# Patient Record
Sex: MALE | NOT HISPANIC OR LATINO | URBAN - METROPOLITAN AREA
[De-identification: names, ages, dates, MRNs, and addresses within clinical notes are randomized per-mention and may not be internally consistent; named-entity substitution may affect disease eponyms.]

---

## 2017-06-15 PROBLEM — Z00.00 ENCOUNTER FOR PREVENTIVE HEALTH EXAMINATION: Status: ACTIVE | Noted: 2017-06-15

## 2021-04-16 ENCOUNTER — EMERGENCY (EMERGENCY)
Facility: HOSPITAL | Age: 46
LOS: 1 days | Discharge: HOME | End: 2021-04-16
Attending: STUDENT IN AN ORGANIZED HEALTH CARE EDUCATION/TRAINING PROGRAM
Payer: SELF-PAY

## 2021-04-16 VITALS
TEMPERATURE: 98 F | DIASTOLIC BLOOD PRESSURE: 90 MMHG | WEIGHT: 177.91 LBS | HEART RATE: 87 BPM | SYSTOLIC BLOOD PRESSURE: 141 MMHG | OXYGEN SATURATION: 99 % | RESPIRATION RATE: 16 BRPM

## 2021-04-16 LAB
APPEARANCE UR: CLEAR — SIGNIFICANT CHANGE UP
BILIRUB UR-MCNC: NEGATIVE — SIGNIFICANT CHANGE UP
COLOR SPEC: YELLOW — SIGNIFICANT CHANGE UP
DIFF PNL FLD: NEGATIVE — SIGNIFICANT CHANGE UP
GLUCOSE UR QL: NEGATIVE — SIGNIFICANT CHANGE UP
KETONES UR-MCNC: NEGATIVE — SIGNIFICANT CHANGE UP
LEUKOCYTE ESTERASE UR-ACNC: NEGATIVE — SIGNIFICANT CHANGE UP
NITRITE UR-MCNC: NEGATIVE — SIGNIFICANT CHANGE UP
PH UR: 5 — SIGNIFICANT CHANGE UP (ref 5–8)
PROT UR-MCNC: 15
RBC CASTS # UR COMP ASSIST: SIGNIFICANT CHANGE UP /HPF (ref 0–2)
SP GR SPEC: 1.02 — SIGNIFICANT CHANGE UP (ref 1.01–1.02)
UROBILINOGEN FLD QL: NEGATIVE — SIGNIFICANT CHANGE UP
WBC UR QL: SIGNIFICANT CHANGE UP /HPF (ref 0–5)

## 2021-04-16 PROCEDURE — 74176 CT ABD & PELVIS W/O CONTRAST: CPT | Mod: 26,MA

## 2021-04-16 PROCEDURE — 99284 EMERGENCY DEPT VISIT MOD MDM: CPT

## 2021-04-16 PROCEDURE — 81001 URINALYSIS AUTO W/SCOPE: CPT

## 2021-04-16 PROCEDURE — 74176 CT ABD & PELVIS W/O CONTRAST: CPT

## 2021-04-16 PROCEDURE — 96374 THER/PROPH/DIAG INJ IV PUSH: CPT

## 2021-04-16 PROCEDURE — 99284 EMERGENCY DEPT VISIT MOD MDM: CPT | Mod: 25

## 2021-04-16 PROCEDURE — 96361 HYDRATE IV INFUSION ADD-ON: CPT

## 2021-04-16 PROCEDURE — 87086 URINE CULTURE/COLONY COUNT: CPT

## 2021-04-16 RX ORDER — KETOROLAC TROMETHAMINE 30 MG/ML
30 SYRINGE (ML) INJECTION ONCE
Refills: 0 | Status: DISCONTINUED | OUTPATIENT
Start: 2021-04-16 | End: 2021-04-16

## 2021-04-16 RX ORDER — SODIUM CHLORIDE 9 MG/ML
1000 INJECTION INTRAMUSCULAR; INTRAVENOUS; SUBCUTANEOUS ONCE
Refills: 0 | Status: COMPLETED | OUTPATIENT
Start: 2021-04-16 | End: 2021-04-16

## 2021-04-16 RX ADMIN — SODIUM CHLORIDE 1000 MILLILITER(S): 9 INJECTION INTRAMUSCULAR; INTRAVENOUS; SUBCUTANEOUS at 18:04

## 2021-04-16 RX ADMIN — Medication 30 MILLIGRAM(S): at 17:04

## 2021-04-16 RX ADMIN — Medication 30 MILLIGRAM(S): at 18:04

## 2021-04-16 RX ADMIN — SODIUM CHLORIDE 1000 MILLILITER(S): 9 INJECTION INTRAMUSCULAR; INTRAVENOUS; SUBCUTANEOUS at 17:04

## 2021-04-16 NOTE — ED PROVIDER NOTE - OBJECTIVE STATEMENT
45M, no significant pmh, presenting with right flank pain. patient reports right sided flank pain that radiates to his stomach for several days. pain worse with movement. no fever, nausea, vomiting, pain or burning with urination or blood in urine.

## 2021-04-16 NOTE — ED PROVIDER NOTE - CLINICAL SUMMARY MEDICAL DECISION MAKING FREE TEXT BOX
45M presenting with flank pain. non-tender on exam. no nausea or vomiting. concern for kidney stone. will get labs, US, CT scan.

## 2021-04-16 NOTE — ED PROVIDER NOTE - PATIENT PORTAL LINK FT
You can access the FollowMyHealth Patient Portal offered by [facility] by registering at the following website: [email or web address]. By joining [facility]’s FollowMyHealth portal, you will also be able to view your health information using other applications (apps) compatible with our system.

## 2021-04-16 NOTE — ED ADULT NURSE NOTE - OBJECTIVE STATEMENT
States he has right lower back pain ,right flank pain radiating to right lower abdomen since yesterday had same symptoms a month ago .

## 2021-04-16 NOTE — ED ADULT NURSE NOTE - NSIMPLEMENTINTERV_GEN_ALL_ED
Implemented All Fall Risk Interventions:  [place] to call system. Call bell, personal items and telephone within reach. Instruct patient to call for assistance. Room bathroom lighting operational. Non-slip footwear when patient is off stretcher. Physically safe environment: no spills, clutter or unnecessary equipment. Stretcher in lowest position, wheels locked, appropriate side rails in place. Provide visual cue, wrist band, yellow gown, etc. Monitor gait and stability. Monitor for mental status changes and reorient to person, place, and time. Review medications for side effects contributing to fall risk. Reinforce activity limits and safety measures with patient and family.

## 2021-04-16 NOTE — ED PROVIDER NOTE - NSFOLLOWUPINSTRUCTIONS_ED_ALL_ED_FT
You were seen in the emergency department for abdominal pain.     Please follow-up with your primary care doctor in the next 24-48 hours.     Please take 600mg Ibuprofen every 6 hours or 975mg Tylenol every 6 hours for pain control.     If you have any worsening symptoms, severe abdominal pain, nausea or vomiting, please return to the emergency department.

## 2021-04-17 LAB
CULTURE RESULTS: SIGNIFICANT CHANGE UP
SPECIMEN SOURCE: SIGNIFICANT CHANGE UP

## 2023-07-26 NOTE — ED ADULT TRIAGE NOTE - CCCP TRG CHIEF CMPLNT
pain, flank Double Island Pedicle Flap Text: The defect edges were debeveled with a #15 scalpel blade.  Given the location of the defect, shape of the defect and the proximity to free margins a double island pedicle advancement flap was deemed most appropriate.  Using a sterile surgical marker, an appropriate advancement flap was drawn incorporating the defect, outlining the appropriate donor tissue and placing the expected incisions within the relaxed skin tension lines where possible.    The area thus outlined was incised deep to adipose tissue with a #15 scalpel blade.  The skin margins were undermined to an appropriate distance in all directions around the primary defect and laterally outward around the island pedicle utilizing iris scissors.  There was minimal undermining beneath the pedicle flap.

## 2024-04-04 ENCOUNTER — EMERGENCY (EMERGENCY)
Facility: HOSPITAL | Age: 49
LOS: 1 days | Discharge: HOME | End: 2024-04-04
Attending: EMERGENCY MEDICINE
Payer: MEDICAID

## 2024-04-04 VITALS
TEMPERATURE: 98 F | DIASTOLIC BLOOD PRESSURE: 67 MMHG | HEART RATE: 86 BPM | RESPIRATION RATE: 18 BRPM | SYSTOLIC BLOOD PRESSURE: 102 MMHG | OXYGEN SATURATION: 99 %

## 2024-04-04 VITALS
DIASTOLIC BLOOD PRESSURE: 87 MMHG | HEIGHT: 68 IN | TEMPERATURE: 100 F | SYSTOLIC BLOOD PRESSURE: 131 MMHG | WEIGHT: 193.12 LBS | HEART RATE: 113 BPM | OXYGEN SATURATION: 96 % | RESPIRATION RATE: 17 BRPM

## 2024-04-04 LAB
ALBUMIN SERPL ELPH-MCNC: 2.7 G/DL — LOW (ref 3.5–5)
ALP SERPL-CCNC: 100 U/L — SIGNIFICANT CHANGE UP (ref 40–120)
ALT FLD-CCNC: 37 U/L DA — SIGNIFICANT CHANGE UP (ref 10–60)
ANION GAP SERPL CALC-SCNC: 6 MMOL/L — SIGNIFICANT CHANGE UP (ref 5–17)
AST SERPL-CCNC: 27 U/L — SIGNIFICANT CHANGE UP (ref 10–40)
BASOPHILS # BLD AUTO: 0.02 K/UL — SIGNIFICANT CHANGE UP (ref 0–0.2)
BASOPHILS NFR BLD AUTO: 0.2 % — SIGNIFICANT CHANGE UP (ref 0–2)
BILIRUB SERPL-MCNC: 1.2 MG/DL — SIGNIFICANT CHANGE UP (ref 0.2–1.2)
BUN SERPL-MCNC: 15 MG/DL — SIGNIFICANT CHANGE UP (ref 7–18)
CALCIUM SERPL-MCNC: 9.1 MG/DL — SIGNIFICANT CHANGE UP (ref 8.4–10.5)
CHLORIDE SERPL-SCNC: 100 MMOL/L — SIGNIFICANT CHANGE UP (ref 96–108)
CO2 SERPL-SCNC: 28 MMOL/L — SIGNIFICANT CHANGE UP (ref 22–31)
CREAT SERPL-MCNC: 1.26 MG/DL — SIGNIFICANT CHANGE UP (ref 0.5–1.3)
EGFR: 71 ML/MIN/1.73M2 — SIGNIFICANT CHANGE UP
EOSINOPHIL # BLD AUTO: 0 K/UL — SIGNIFICANT CHANGE UP (ref 0–0.5)
EOSINOPHIL NFR BLD AUTO: 0 % — SIGNIFICANT CHANGE UP (ref 0–6)
FLUAV AG NPH QL: SIGNIFICANT CHANGE UP
FLUBV AG NPH QL: SIGNIFICANT CHANGE UP
GLUCOSE SERPL-MCNC: 104 MG/DL — HIGH (ref 70–99)
HCT VFR BLD CALC: 45.1 % — SIGNIFICANT CHANGE UP (ref 39–50)
HGB BLD-MCNC: 15 G/DL — SIGNIFICANT CHANGE UP (ref 13–17)
IMM GRANULOCYTES NFR BLD AUTO: 0.3 % — SIGNIFICANT CHANGE UP (ref 0–0.9)
LYMPHOCYTES # BLD AUTO: 0.66 K/UL — LOW (ref 1–3.3)
LYMPHOCYTES # BLD AUTO: 6.8 % — LOW (ref 13–44)
MCHC RBC-ENTMCNC: 27.9 PG — SIGNIFICANT CHANGE UP (ref 27–34)
MCHC RBC-ENTMCNC: 33.3 GM/DL — SIGNIFICANT CHANGE UP (ref 32–36)
MCV RBC AUTO: 83.8 FL — SIGNIFICANT CHANGE UP (ref 80–100)
MONOCYTES # BLD AUTO: 1.46 K/UL — HIGH (ref 0–0.9)
MONOCYTES NFR BLD AUTO: 15 % — HIGH (ref 2–14)
NEUTROPHILS # BLD AUTO: 7.58 K/UL — HIGH (ref 1.8–7.4)
NEUTROPHILS NFR BLD AUTO: 77.7 % — HIGH (ref 43–77)
NRBC # BLD: 0 /100 WBCS — SIGNIFICANT CHANGE UP (ref 0–0)
NT-PROBNP SERPL-SCNC: 124 PG/ML — SIGNIFICANT CHANGE UP (ref 0–125)
PLATELET # BLD AUTO: 69 K/UL — LOW (ref 150–400)
POTASSIUM SERPL-MCNC: 3.1 MMOL/L — LOW (ref 3.5–5.3)
POTASSIUM SERPL-SCNC: 3.1 MMOL/L — LOW (ref 3.5–5.3)
PROT SERPL-MCNC: 7.1 G/DL — SIGNIFICANT CHANGE UP (ref 6–8.3)
RBC # BLD: 5.38 M/UL — SIGNIFICANT CHANGE UP (ref 4.2–5.8)
RBC # FLD: 14.5 % — SIGNIFICANT CHANGE UP (ref 10.3–14.5)
SARS-COV-2 RNA SPEC QL NAA+PROBE: SIGNIFICANT CHANGE UP
SODIUM SERPL-SCNC: 134 MMOL/L — LOW (ref 135–145)
TROPONIN I, HIGH SENSITIVITY RESULT: 4.9 NG/L — SIGNIFICANT CHANGE UP
WBC # BLD: 9.75 K/UL — SIGNIFICANT CHANGE UP (ref 3.8–10.5)
WBC # FLD AUTO: 9.75 K/UL — SIGNIFICANT CHANGE UP (ref 3.8–10.5)

## 2024-04-04 PROCEDURE — 71046 X-RAY EXAM CHEST 2 VIEWS: CPT

## 2024-04-04 PROCEDURE — 83880 ASSAY OF NATRIURETIC PEPTIDE: CPT

## 2024-04-04 PROCEDURE — 82962 GLUCOSE BLOOD TEST: CPT

## 2024-04-04 PROCEDURE — 96374 THER/PROPH/DIAG INJ IV PUSH: CPT

## 2024-04-04 PROCEDURE — 36415 COLL VENOUS BLD VENIPUNCTURE: CPT

## 2024-04-04 PROCEDURE — 93010 ELECTROCARDIOGRAM REPORT: CPT

## 2024-04-04 PROCEDURE — 80053 COMPREHEN METABOLIC PANEL: CPT

## 2024-04-04 PROCEDURE — 99285 EMERGENCY DEPT VISIT HI MDM: CPT

## 2024-04-04 PROCEDURE — 84484 ASSAY OF TROPONIN QUANT: CPT

## 2024-04-04 PROCEDURE — 96375 TX/PRO/DX INJ NEW DRUG ADDON: CPT

## 2024-04-04 PROCEDURE — 99285 EMERGENCY DEPT VISIT HI MDM: CPT | Mod: 25

## 2024-04-04 PROCEDURE — 87637 SARSCOV2&INF A&B&RSV AMP PRB: CPT

## 2024-04-04 PROCEDURE — 93005 ELECTROCARDIOGRAM TRACING: CPT

## 2024-04-04 PROCEDURE — 70450 CT HEAD/BRAIN W/O DYE: CPT | Mod: MC

## 2024-04-04 PROCEDURE — 70450 CT HEAD/BRAIN W/O DYE: CPT | Mod: 26,MC

## 2024-04-04 PROCEDURE — 85025 COMPLETE CBC W/AUTO DIFF WBC: CPT

## 2024-04-04 PROCEDURE — 71046 X-RAY EXAM CHEST 2 VIEWS: CPT | Mod: 26

## 2024-04-04 RX ORDER — ACETAMINOPHEN 500 MG
1000 TABLET ORAL ONCE
Refills: 0 | Status: COMPLETED | OUTPATIENT
Start: 2024-04-04 | End: 2024-04-04

## 2024-04-04 RX ORDER — ONDANSETRON 8 MG/1
4 TABLET, FILM COATED ORAL ONCE
Refills: 0 | Status: COMPLETED | OUTPATIENT
Start: 2024-04-04 | End: 2024-04-04

## 2024-04-04 RX ORDER — POTASSIUM CHLORIDE 20 MEQ
40 PACKET (EA) ORAL ONCE
Refills: 0 | Status: COMPLETED | OUTPATIENT
Start: 2024-04-04 | End: 2024-04-04

## 2024-04-04 RX ORDER — KETOROLAC TROMETHAMINE 30 MG/ML
15 SYRINGE (ML) INJECTION ONCE
Refills: 0 | Status: DISCONTINUED | OUTPATIENT
Start: 2024-04-04 | End: 2024-04-04

## 2024-04-04 RX ADMIN — Medication 15 MILLIGRAM(S): at 17:09

## 2024-04-04 RX ADMIN — Medication 400 MILLIGRAM(S): at 17:09

## 2024-04-04 RX ADMIN — Medication 40 MILLIEQUIVALENT(S): at 18:09

## 2024-04-04 RX ADMIN — ONDANSETRON 4 MILLIGRAM(S): 8 TABLET, FILM COATED ORAL at 17:09

## 2024-04-04 NOTE — ED ADULT NURSE NOTE - NSFALLUNIVINTERV_ED_ALL_ED
Bed/Stretcher in lowest position, wheels locked, appropriate side rails in place/Call bell, personal items and telephone in reach/Instruct patient to call for assistance before getting out of bed/chair/stretcher/Non-slip footwear applied when patient is off stretcher/[place] to call system/Physically safe environment - no spills, clutter or unnecessary equipment/Purposeful proactive rounding/Room/bathroom lighting operational, light cord in reach

## 2024-04-04 NOTE — ED PROVIDER NOTE - NSFOLLOWUPINSTRUCTIONS_ED_ALL_ED_FT
Sinus Infection, Adult  A person's face, and a person's face showing an internal view of the sinuses. Here the sinuses contain mucus.  A sinus infection, also called sinusitis, is inflammation of your sinuses. Sinuses are hollow spaces in the bones around your face. Your sinuses are located:  Around your eyes.  In the middle of your forehead.  Behind your nose.  In your cheekbones.  Mucus normally drains out of your sinuses. When your nasal tissues become inflamed or swollen, mucus can become trapped or blocked. This allows bacteria, viruses, and fungi to grow, which leads to infection. Most infections of the sinuses are caused by a virus.    A sinus infection can develop quickly. It can last for up to 4 weeks (acute) or for more than 12 weeks (chronic). A sinus infection often develops after a cold.    What are the causes?  This condition is caused by anything that creates swelling in the sinuses or stops mucus from draining. This includes:  Allergies.  Asthma.  Infection from bacteria or viruses.  Deformities or blockages in your nose or sinuses.  Abnormal growths in the nose (nasal polyps).  Pollutants, such as chemicals or irritants in the air.  Infection from fungi. This is rare.  What increases the risk?  You are more likely to develop this condition if you:  Have a weak body defense system (immune system).  Do a lot of swimming or diving.  Overuse nasal sprays.  Smoke.  What are the signs or symptoms?  The main symptoms of this condition are pain and a feeling of pressure around the affected sinuses. Other symptoms include:  Stuffy nose or congestion that makes it difficult to breathe through your nose.  Thick yellow or greenish drainage from your nose.  Tenderness, swelling, and warmth over the affected sinuses.  A cough that may get worse at night.  Decreased sense of smell and taste.  Extra mucus that collects in the throat or the back of the nose (postnasal drip) causing a sore throat or bad breath.  Tiredness (fatigue).  Fever.  How is this diagnosed?  This condition is diagnosed based on:  Your symptoms.  Your medical history.  A physical exam.  Tests to find out if your condition is acute or chronic. This may include:  Checking your nose for nasal polyps.  Viewing your sinuses using a device that has a light (endoscope).  Testing for allergies or bacteria.  Imaging tests, such as an MRI or CT scan.  In rare cases, a bone biopsy may be done to rule out more serious types of fungal sinus disease.    How is this treated?  Treatment for a sinus infection depends on the cause and whether your condition is chronic or acute.  If caused by a virus, your symptoms should go away on their own within 10 days. You may be given medicines to relieve symptoms. They include:  Medicines that shrink swollen nasal passages (decongestants).  A spray that eases inflammation of the nostrils (topical intranasal corticosteroids).  Rinses that help get rid of thick mucus in your nose (nasal saline washes).  Medicines that treat allergies (antihistamines).  Over-the-counter pain relievers.  If caused by bacteria, your health care provider may recommend waiting to see if your symptoms improve. Most bacterial infections will get better without antibiotic medicine. You may be given antibiotics if you have:  A severe infection.  A weak immune system.  If caused by narrow nasal passages or nasal polyps, surgery may be needed.  Follow these instructions at home:  Medicines    Take, use, or apply over-the-counter and prescription medicines only as told by your health care provider. These may include nasal sprays.  If you were prescribed an antibiotic medicine, take it as told by your health care provider. Do not stop taking the antibiotic even if you start to feel better.  Hydrate and humidify    A comparison of three sample cups showing dark yellow, yellow, and pale yellow urine.  Drink enough fluid to keep your urine pale yellow. Staying hydrated will help to thin your mucus.  Use a cool mist humidifier to keep the humidity level in your home above 50%.  Inhale steam for 10–15 minutes, 3–4 times a day, or as told by your health care provider. You can do this in the bathroom while a hot shower is running.  Limit your exposure to cool or dry air.  Rest    Rest as much as possible.  Sleep with your head raised (elevated).  Make sure you get enough sleep each night.  General instructions    A person washing hands with soap and water.  Apply a warm, moist washcloth to your face 3–4 times a day or as told by your health care provider. This will help with discomfort.  Use nasal saline washes as often as told by your health care provider.  Wash your hands often with soap and water to reduce your exposure to germs. If soap and water are not available, use hand .  Do not smoke. Avoid being around people who are smoking (secondhand smoke).  Keep all follow-up visits. This is important.  Contact a health care provider if:  You have a fever.  Your symptoms get worse.  Your symptoms do not improve within 10 days.  Get help right away if:  You have a severe headache.  You have persistent vomiting.  You have severe pain or swelling around your face or eyes.  You have vision problems.  You develop confusion.  Your neck is stiff.  You have trouble breathing.  These symptoms may be an emergency. Get help right away. Call 911.  Do not wait to see if the symptoms will go away.  Do not drive yourself to the hospital.  Summary  A sinus infection is soreness and inflammation of your sinuses. Sinuses are hollow spaces in the bones around your face.  This condition is caused by nasal tissues that become inflamed or swollen. The swelling traps or blocks the flow of mucus. This allows bacteria, viruses, and fungi to grow, which leads to infection.  If you were prescribed an antibiotic medicine, take it as told by your health care provider. Do not stop taking the antibiotic even if you start to feel better.  Keep all follow-up visits. This is important.  This information is not intended to replace advice given to you by your health care provider. Make sure you discuss any questions you have with your health care provider.    Document Revised: 11/22/2022 Document Reviewed: 11/22/2022  [employer] Patient Education © 2024 [employer]

## 2024-04-04 NOTE — ED ADULT TRIAGE NOTE - CHIEF COMPLAINT QUOTE
Patient c/o syncopal episode yesterday, denies hitting head. Reports headache, nausea, and shortness of breath x 4 days. Denies chest pain. FS in triage 109

## 2024-04-04 NOTE — ED PROVIDER NOTE - CLINICAL SUMMARY MEDICAL DECISION MAKING FREE TEXT BOX
48-year-old male presents to ED with headache, sinus pressure, flulike symptoms.  Concern for sinusitis versus COVID versus flu versus less likely pneumonia.  Will check labs, supportive care, viral swab, reassess

## 2024-04-04 NOTE — ED PROVIDER NOTE - PROGRESS NOTE DETAILS
Labs and imaging negative.  Patient reassessed and reports feeling better.  Will treat with antibiotics for clinical sinusitis

## 2024-04-04 NOTE — ED PROVIDER NOTE - OBJECTIVE STATEMENT
48-year-old male history of hyperlipidemia, not on any medications, presents with a complaint of headache, shortness of breath, and a syncopal episode.  As per patient symptoms began 4 days ago with a headache.  Patient thought it could have been a possible sinus infection.  The following day patient developed subjective fever and chills.  Patient then developed nausea.  Headache continued.  Yesterday patient said he had a syncopal episode.  Headache and shortness of breath continued today so the patient came in for evaluation.  No chest pain, no cough, no diarrhea, no urinary complaints, no sore throat, no rash

## 2024-04-04 NOTE — ED PROVIDER NOTE - PATIENT PORTAL LINK FT
You can access the FollowMyHealth Patient Portal offered by [facility] by registering at the following website: [email or web address]. By joining [facility]’s FollowMyHealth portal, you will also be able to view your health information using other applications (apps) compatible with our system.

## 2024-04-06 ENCOUNTER — INPATIENT (INPATIENT)
Facility: HOSPITAL | Age: 49
LOS: 4 days | Discharge: HOME | End: 2024-04-11
Attending: INTERNAL MEDICINE | Admitting: INTERNAL MEDICINE
Payer: MEDICAID

## 2024-04-06 VITALS
DIASTOLIC BLOOD PRESSURE: 82 MMHG | HEIGHT: 68 IN | RESPIRATION RATE: 18 BRPM | HEART RATE: 112 BPM | TEMPERATURE: 100 F | SYSTOLIC BLOOD PRESSURE: 113 MMHG | OXYGEN SATURATION: 106 %

## 2024-04-06 DIAGNOSIS — R42 DIZZINESS AND GIDDINESS: ICD-10-CM

## 2024-04-06 LAB
ALBUMIN SERPL ELPH-MCNC: 3.1 G/DL — LOW (ref 3.3–5)
ALP SERPL-CCNC: 187 U/L — HIGH (ref 40–120)
ALT FLD-CCNC: 75 U/L — HIGH (ref 4–41)
ANION GAP SERPL CALC-SCNC: 14 MMOL/L — SIGNIFICANT CHANGE UP (ref 7–14)
APPEARANCE CSF: CLEAR — SIGNIFICANT CHANGE UP
APPEARANCE SPUN FLD: COLORLESS — SIGNIFICANT CHANGE UP
APPEARANCE UR: CLEAR — SIGNIFICANT CHANGE UP
APTT BLD: 34.4 SEC — SIGNIFICANT CHANGE UP (ref 24.5–35.6)
AST SERPL-CCNC: 105 U/L — HIGH (ref 4–40)
BACTERIAL AG PNL SER: 6 % — SIGNIFICANT CHANGE UP
BASE EXCESS BLDV CALC-SCNC: 0.2 MMOL/L — SIGNIFICANT CHANGE UP (ref -2–3)
BASOPHILS # BLD AUTO: 0 K/UL — SIGNIFICANT CHANGE UP (ref 0–0.2)
BASOPHILS NFR BLD AUTO: 0 % — SIGNIFICANT CHANGE UP (ref 0–2)
BILIRUB SERPL-MCNC: 0.7 MG/DL — SIGNIFICANT CHANGE UP (ref 0.2–1.2)
BILIRUB UR-MCNC: NEGATIVE — SIGNIFICANT CHANGE UP
BLOOD GAS VENOUS COMPREHENSIVE RESULT: SIGNIFICANT CHANGE UP
BUN SERPL-MCNC: 17 MG/DL — SIGNIFICANT CHANGE UP (ref 7–23)
CALCIUM SERPL-MCNC: 8.8 MG/DL — SIGNIFICANT CHANGE UP (ref 8.4–10.5)
CHLORIDE BLDV-SCNC: 99 MMOL/L — SIGNIFICANT CHANGE UP (ref 96–108)
CHLORIDE SERPL-SCNC: 97 MMOL/L — LOW (ref 98–107)
CO2 BLDV-SCNC: 27.4 MMOL/L — HIGH (ref 22–26)
CO2 SERPL-SCNC: 23 MMOL/L — SIGNIFICANT CHANGE UP (ref 22–31)
COLOR CSF: COLORLESS — SIGNIFICANT CHANGE UP
COLOR SPEC: YELLOW — SIGNIFICANT CHANGE UP
CREAT SERPL-MCNC: 0.99 MG/DL — SIGNIFICANT CHANGE UP (ref 0.5–1.3)
DIFF PNL FLD: NEGATIVE — SIGNIFICANT CHANGE UP
EGFR: 95 ML/MIN/1.73M2 — SIGNIFICANT CHANGE UP
EOSINOPHIL # BLD AUTO: 0 K/UL — SIGNIFICANT CHANGE UP (ref 0–0.5)
EOSINOPHIL NFR BLD AUTO: 0 % — SIGNIFICANT CHANGE UP (ref 0–6)
FLUAV AG NPH QL: SIGNIFICANT CHANGE UP
FLUBV AG NPH QL: SIGNIFICANT CHANGE UP
GAS PNL BLDV: 131 MMOL/L — LOW (ref 136–145)
GIANT PLATELETS BLD QL SMEAR: PRESENT — SIGNIFICANT CHANGE UP
GLUCOSE BLDV-MCNC: 109 MG/DL — HIGH (ref 70–99)
GLUCOSE CSF-MCNC: 67 MG/DL — SIGNIFICANT CHANGE UP (ref 40–70)
GLUCOSE SERPL-MCNC: 132 MG/DL — HIGH (ref 70–99)
GLUCOSE UR QL: NEGATIVE MG/DL — SIGNIFICANT CHANGE UP
HCO3 BLDV-SCNC: 26 MMOL/L — SIGNIFICANT CHANGE UP (ref 22–29)
HCT VFR BLD CALC: 44.4 % — SIGNIFICANT CHANGE UP (ref 39–50)
HCT VFR BLDA CALC: 47 % — SIGNIFICANT CHANGE UP (ref 39–51)
HGB BLD CALC-MCNC: 15.7 G/DL — SIGNIFICANT CHANGE UP (ref 12.6–17.4)
HGB BLD-MCNC: 15.2 G/DL — SIGNIFICANT CHANGE UP (ref 13–17)
IANC: 5.74 K/UL — SIGNIFICANT CHANGE UP (ref 1.8–7.4)
INR BLD: 1.14 RATIO — SIGNIFICANT CHANGE UP (ref 0.85–1.18)
KETONES UR-MCNC: 15 MG/DL
LACTATE BLDV-MCNC: 1.6 MMOL/L — SIGNIFICANT CHANGE UP (ref 0.5–2)
LEUKOCYTE ESTERASE UR-ACNC: NEGATIVE — SIGNIFICANT CHANGE UP
LYMPHOCYTES # BLD AUTO: 0.69 K/UL — LOW (ref 1–3.3)
LYMPHOCYTES # BLD AUTO: 8.7 % — LOW (ref 13–44)
LYMPHOCYTES # CSF: 49 % — SIGNIFICANT CHANGE UP
MCHC RBC-ENTMCNC: 28 PG — SIGNIFICANT CHANGE UP (ref 27–34)
MCHC RBC-ENTMCNC: 34.2 GM/DL — SIGNIFICANT CHANGE UP (ref 32–36)
MCV RBC AUTO: 81.9 FL — SIGNIFICANT CHANGE UP (ref 80–100)
MONOCYTES # BLD AUTO: 1.17 K/UL — HIGH (ref 0–0.9)
MONOCYTES NFR BLD AUTO: 14.8 % — HIGH (ref 2–14)
MONOS+MACROS NFR CSF: 43 % — SIGNIFICANT CHANGE UP
NEUTROPHILS # BLD AUTO: 6.03 K/UL — SIGNIFICANT CHANGE UP (ref 1.8–7.4)
NEUTROPHILS # CSF: 2 % — SIGNIFICANT CHANGE UP
NEUTROPHILS NFR BLD AUTO: 72.2 % — SIGNIFICANT CHANGE UP (ref 43–77)
NEUTS BAND # BLD: 4.3 % — SIGNIFICANT CHANGE UP (ref 0–6)
NITRITE UR-MCNC: NEGATIVE — SIGNIFICANT CHANGE UP
NRBC NFR CSF: 58 CELLS/UL — HIGH (ref 0–5)
PCO2 BLDV: 45 MMHG — SIGNIFICANT CHANGE UP (ref 42–55)
PH BLDV: 7.37 — SIGNIFICANT CHANGE UP (ref 7.32–7.43)
PH UR: 6 — SIGNIFICANT CHANGE UP (ref 5–8)
PLAT MORPH BLD: NORMAL — SIGNIFICANT CHANGE UP
PLATELET # BLD AUTO: 87 K/UL — LOW (ref 150–400)
PLATELET COUNT - ESTIMATE: ABNORMAL
PO2 BLDV: 29 MMHG — SIGNIFICANT CHANGE UP (ref 25–45)
POTASSIUM BLDV-SCNC: 3.3 MMOL/L — LOW (ref 3.5–5.1)
POTASSIUM SERPL-MCNC: 3.7 MMOL/L — SIGNIFICANT CHANGE UP (ref 3.5–5.3)
POTASSIUM SERPL-SCNC: 3.7 MMOL/L — SIGNIFICANT CHANGE UP (ref 3.5–5.3)
PROT CSF-MCNC: 64 MG/DL — HIGH (ref 15–45)
PROT SERPL-MCNC: 7.4 G/DL — SIGNIFICANT CHANGE UP (ref 6–8.3)
PROT UR-MCNC: SIGNIFICANT CHANGE UP MG/DL
PROTHROM AB SERPL-ACNC: 12.8 SEC — SIGNIFICANT CHANGE UP (ref 9.5–13)
RBC # BLD: 5.42 M/UL — SIGNIFICANT CHANGE UP (ref 4.2–5.8)
RBC # CSF: 11 CELLS/UL — HIGH (ref 0–0)
RBC # FLD: 14.8 % — HIGH (ref 10.3–14.5)
RBC BLD AUTO: NORMAL — SIGNIFICANT CHANGE UP
RSV RNA NPH QL NAA+NON-PROBE: SIGNIFICANT CHANGE UP
SAO2 % BLDV: 42.7 % — LOW (ref 67–88)
SARS-COV-2 RNA SPEC QL NAA+PROBE: SIGNIFICANT CHANGE UP
SODIUM SERPL-SCNC: 134 MMOL/L — LOW (ref 135–145)
SP GR SPEC: 1.05 — HIGH (ref 1–1.03)
TOTAL CELLS COUNTED, SPINAL FLUID: 100 CELLS — SIGNIFICANT CHANGE UP
TUBE TYPE: SIGNIFICANT CHANGE UP
UROBILINOGEN FLD QL: 1 MG/DL — SIGNIFICANT CHANGE UP (ref 0.2–1)
WBC # BLD: 7.88 K/UL — SIGNIFICANT CHANGE UP (ref 3.8–10.5)
WBC # FLD AUTO: 7.88 K/UL — SIGNIFICANT CHANGE UP (ref 3.8–10.5)

## 2024-04-06 PROCEDURE — 62270 DX LMBR SPI PNXR: CPT

## 2024-04-06 PROCEDURE — 70496 CT ANGIOGRAPHY HEAD: CPT | Mod: 26,MC

## 2024-04-06 PROCEDURE — 70498 CT ANGIOGRAPHY NECK: CPT | Mod: 26,MC

## 2024-04-06 PROCEDURE — 71045 X-RAY EXAM CHEST 1 VIEW: CPT | Mod: 26

## 2024-04-06 PROCEDURE — 99285 EMERGENCY DEPT VISIT HI MDM: CPT | Mod: 25

## 2024-04-06 RX ORDER — SODIUM CHLORIDE 9 MG/ML
1000 INJECTION INTRAMUSCULAR; INTRAVENOUS; SUBCUTANEOUS ONCE
Refills: 0 | Status: COMPLETED | OUTPATIENT
Start: 2024-04-06 | End: 2024-04-06

## 2024-04-06 RX ORDER — DEXAMETHASONE 0.5 MG/5ML
6 ELIXIR ORAL ONCE
Refills: 0 | Status: COMPLETED | OUTPATIENT
Start: 2024-04-06 | End: 2024-04-06

## 2024-04-06 RX ORDER — VANCOMYCIN HCL 1 G
1000 VIAL (EA) INTRAVENOUS ONCE
Refills: 0 | Status: COMPLETED | OUTPATIENT
Start: 2024-04-06 | End: 2024-04-07

## 2024-04-06 RX ORDER — KETOROLAC TROMETHAMINE 30 MG/ML
15 SYRINGE (ML) INJECTION ONCE
Refills: 0 | Status: DISCONTINUED | OUTPATIENT
Start: 2024-04-06 | End: 2024-04-06

## 2024-04-06 RX ORDER — PSEUDOEPHEDRINE HCL 30 MG
30 TABLET ORAL ONCE
Refills: 0 | Status: COMPLETED | OUTPATIENT
Start: 2024-04-06 | End: 2024-04-06

## 2024-04-06 RX ORDER — MECLIZINE HCL 12.5 MG
25 TABLET ORAL ONCE
Refills: 0 | Status: COMPLETED | OUTPATIENT
Start: 2024-04-06 | End: 2024-04-06

## 2024-04-06 RX ORDER — CEFTRIAXONE 500 MG/1
2000 INJECTION, POWDER, FOR SOLUTION INTRAMUSCULAR; INTRAVENOUS ONCE
Refills: 0 | Status: COMPLETED | OUTPATIENT
Start: 2024-04-06 | End: 2024-04-06

## 2024-04-06 RX ORDER — MECLIZINE HCL 12.5 MG
1 TABLET ORAL
Qty: 12 | Refills: 0
Start: 2024-04-06 | End: 2024-04-09

## 2024-04-06 RX ORDER — ACYCLOVIR SODIUM 500 MG
870 VIAL (EA) INTRAVENOUS ONCE
Refills: 0 | Status: COMPLETED | OUTPATIENT
Start: 2024-04-06 | End: 2024-04-06

## 2024-04-06 RX ORDER — ACETAMINOPHEN 500 MG
650 TABLET ORAL ONCE
Refills: 0 | Status: COMPLETED | OUTPATIENT
Start: 2024-04-06 | End: 2024-04-06

## 2024-04-06 RX ORDER — METOCLOPRAMIDE HCL 10 MG
10 TABLET ORAL ONCE
Refills: 0 | Status: COMPLETED | OUTPATIENT
Start: 2024-04-06 | End: 2024-04-06

## 2024-04-06 RX ORDER — ONDANSETRON 8 MG/1
1 TABLET, FILM COATED ORAL
Qty: 15 | Refills: 0
Start: 2024-04-06 | End: 2024-04-09

## 2024-04-06 RX ADMIN — Medication 25 MILLIGRAM(S): at 17:07

## 2024-04-06 RX ADMIN — CEFTRIAXONE 100 MILLIGRAM(S): 500 INJECTION, POWDER, FOR SOLUTION INTRAMUSCULAR; INTRAVENOUS at 20:20

## 2024-04-06 RX ADMIN — Medication 267.4 MILLIGRAM(S): at 21:10

## 2024-04-06 RX ADMIN — SODIUM CHLORIDE 1000 MILLILITER(S): 9 INJECTION INTRAMUSCULAR; INTRAVENOUS; SUBCUTANEOUS at 19:35

## 2024-04-06 RX ADMIN — Medication 10 MILLIGRAM(S): at 12:58

## 2024-04-06 RX ADMIN — Medication 650 MILLIGRAM(S): at 21:55

## 2024-04-06 RX ADMIN — Medication 15 MILLIGRAM(S): at 12:58

## 2024-04-06 RX ADMIN — SODIUM CHLORIDE 1000 MILLILITER(S): 9 INJECTION INTRAMUSCULAR; INTRAVENOUS; SUBCUTANEOUS at 12:59

## 2024-04-06 RX ADMIN — SODIUM CHLORIDE 1000 MILLILITER(S): 9 INJECTION INTRAMUSCULAR; INTRAVENOUS; SUBCUTANEOUS at 17:09

## 2024-04-06 RX ADMIN — Medication 650 MILLIGRAM(S): at 19:35

## 2024-04-06 RX ADMIN — Medication 15 MILLIGRAM(S): at 21:55

## 2024-04-06 RX ADMIN — Medication 30 MILLIGRAM(S): at 13:03

## 2024-04-06 RX ADMIN — Medication 6 MILLIGRAM(S): at 20:19

## 2024-04-06 NOTE — ED PROVIDER NOTE - OBJECTIVE STATEMENT
This is a 48-year-old male past medical history hyperlipidemia, (currently not on medication) with complaint on of double vision, headache, dizziness, vomiting x 2  in this morning and unsteady gait. Endorses he did not feel well since Monday, c/o fever chills intermittent shortness of breath. Thursday he went to [facility] he was diagnosed with sinus infection, Augmentin was ordered. Currently taking antibiotics and Tylenol and Motrin over-the-counter with less relief. Denies chest pain, chest discomfort, abdominal pain, urinary symptoms  blood in the urine and stool  slurred speech, numbness, weakness, tingling, sensory deficits. This is a 48-year-old male past medical history hyperlipidemia, (currently not on medication) with complaint on of double vision, headache, dizziness, vomiting x 2  in this morning and unsteady gait. Endorses he did not feel well since Monday, c/o fever chills intermittent shortness of breath. Thursday he went to [facility] he was diagnosed with sinus infection, Augmentin was ordered. Currently taking antibiotics and Tylenol and Motrin over-the-counter with less relief. Denies chest pain, chest discomfort, abdominal pain, urinary symptoms  blood in the urine and stool  slurred speech, numbness, weakness, tingling, sensory deficits.

## 2024-04-06 NOTE — CONSULT NOTE ADULT - ASSESSMENT
Impression: Multiple neurologic complaints, including HA, transient double vision, gait instability, and significant change in mental status, also found to have elevated liver enzymes (, , ALT 75), PLT 87, and b/l cervical lymphadenopathy. DDx includes extracranial toxic metabolic infectious processes vs acute intracranial process.    Recommendations: To be discussed  Impression: Multiple neurologic complaints, including HA, transient double vision, gait instability, and significant change in mental status, also found to have elevated liver enzymes (, , ALT 75), PLT 87, and b/l cervical lymphadenopathy. DDx includes extracranial toxic metabolic infectious processes vs acute intracranial process.    Recommendations:  [ ] LP - Please obtain the following CSF studies: Protein, Glucose, Cytology, Bacterial Culture, Fungal Culture, Acid Fast Culture, Cryptococcus, Viral PCR, HSV1/2 PCR, Lyme, WNV, ACE, CSF IgG Index, OCB, NMO, MOG, MBP, Autoimmune panel, Paraneoplastic panel, flow cytometry, path  [ ] MRI Brain w/wo IV contrast  [ ] vEEG  [ ] Serum encephalopathy workup: Infectious workup (BCx, UCx, UA, CXR, RVP+COVID), CBC, CMP, Mg, Phos, Vitamin B1, B6, B12, Folate, Vitamin D 25OH, Vitamin E, TSH, FT4, Ammonia, Lactate, CK, Syphillis, ESR, CRP, WNV, Lyme, AIE, Paraneoplastic.  [ ] CTM LFTs   [ ] Rest of workup/management per ED/primary team/respective consultants    Case discussed with neurology attending Dr. Zhang. Recommendations not finalized until attested by attending.     Impression: Multiple neurologic complaints, including HA, transient double vision, gait instability, and significant change in mental status, also found to have elevated liver enzymes (, , ALT 75), PLT 87, and b/l cervical lymphadenopathy. DDx includes extracranial toxic metabolic infectious processes vs acute intracranial process (including bacterial/viral meningitis and autoimmune encephalitis)    Recommendations:  [ ] LP - Please obtain the following CSF studies: Protein, Glucose, Cytology, Bacterial Culture, Fungal Culture, Acid Fast Culture, Cryptococcus, Viral PCR, HSV1/2 PCR, Lyme, WNV, ACE, CSF IgG Index, OCB, NMO, MOG, MBP, Autoimmune panel, Paraneoplastic panel, flow cytometry, path  [ ] Empiric antimicrobials for bacterial/viral meningitis per ED/primary team  [ ] MRI Brain w/wo IV contrast  [ ] vEEG  [ ] Serum encephalopathy workup: Infectious workup (BCx, UCx, UA, CXR, RVP+COVID), CBC, CMP, Mg, Phos, Vitamin B1, B6, B12, Folate, Vitamin D 25OH, Vitamin E, TSH, FT4, Ammonia, Lactate, CK, Syphillis, ESR, CRP, WNV, Lyme, AIE, Paraneoplastic.  [ ] CTM LFTs   [ ] Rest of workup/management per ED/primary team/respective consultants    Case discussed with neurology attending Dr. Zhang. Recommendations not finalized until attested by attending.     Impression: Multiple neurologic complaints, including HA, transient double vision, gait instability, and significant change in mental status, also found to be febrile to 100.7F and have elevated liver enzymes (, , ALT 75), PLT 87, and b/l cervical lymphadenopathy. DDx includes extracranial toxic metabolic infectious processes vs acute intracranial process (including bacterial/viral meningitis and autoimmune encephalitis)    Recommendations:  [ ] LP - Please obtain the following CSF studies: Protein, Glucose, Cytology, Bacterial Culture, Fungal Culture, Acid Fast Culture, Cryptococcus, Viral PCR, HSV1/2 PCR, Lyme, WNV, ACE, CSF IgG Index, OCB, NMO, MOG, MBP, Autoimmune panel, Paraneoplastic panel, flow cytometry, path  [ ] Empiric antimicrobials for bacterial/viral meningitis per ED/primary team  [ ] MRI Brain w/wo IV contrast  [ ] vEEG  [ ] Serum encephalopathy workup: Infectious workup (BCx, UCx, UA, CXR, RVP+COVID), CBC, CMP, Mg, Phos, Vitamin B1, B6, B12, Folate, Vitamin D 25OH, Vitamin E, TSH, FT4, Ammonia, Lactate, CK, Syphillis, ESR, CRP, WNV, Lyme, AIE, Paraneoplastic.  [ ] CTM LFTs   [ ] Rest of workup/management per ED/primary team/respective consultants    Case discussed with neurology attending Dr. Zhang. Recommendations not finalized until attested by attending.     Impression: Multiple neurologic complaints, including HA, transient double vision, gait instability, and significant change in mental status, also found to be febrile to 100.7F and have elevated liver enzymes (, , ALT 75), PLT 87, and b/l cervical lymphadenopathy. DDx includes extracranial toxic metabolic infectious processes vs acute intracranial process (including bacterial/viral meningitis and autoimmune encephalitis)    Recommendations:  [ ] LP - Please obtain the following CSF studies: Protein, Glucose, Cytology, Bacterial Culture, Fungal Culture, Acid Fast Culture, Cryptococcus, Viral PCR, HSV1/2 PCR, Lyme, WNV, ACE, CSF IgG Index.  [ ] Empiric antimicrobials for bacterial/viral meningitis per ED/primary team  [ ] MRI Brain w/wo IV contrast  [ ] vEEG  [ ] Serum encephalopathy workup: Infectious workup (BCx, UCx, UA, CXR, RVP+COVID), CBC, CMP, Mg, Phos, Vitamin B1, B6, B12, Folate, Vitamin D 25OH, Vitamin E, TSH, FT4, Ammonia, Lactate, CK, Syphillis, ESR, CRP, WNV, Lyme, AIE, Paraneoplastic.  [ ] CTM LFTs   [ ] Rest of workup/management per ED/primary team/respective consultants    Case discussed with neurology attending Dr. Zhang. Recommendations not finalized until attested by attending.

## 2024-04-06 NOTE — ED ADULT NURSE REASSESSMENT NOTE - REASSESS COMMUNICATION
spoke to MANUELITO Turcios and Dr Mancia about change in patient since I last saw him. Pt taken to xray via wheelchair then to [facility] for further evaluation. report to BERTRAM Gruber./ED physician notified
medicated as ordered, vs as documented

## 2024-04-06 NOTE — ED PROVIDER NOTE - ATTENDING APP SHARED VISIT CONTRIBUTION OF CARE
Dr. Casillas: 47 yo male with HLD, in ED with HA, dizziness and N/V this morning, with difficulty walking due to dizziness.  Pt was at [facility] yesterday for this, diagnosed with sinusitis, discharged with Rx Augmentin.  Has been taking abx, as well as OTC phenylephrine, acetaminophen and ibuprofen.  He denies CP/SOB, abdominal pain, diarrhea, numbness, tingling or focal weakness.  On exam pt overall well appearing, in NAD, heart RRR, lungs CTAB, abd NTND, extremities without swelling, strength 5/5 in all extremities and skin without rash.  EOMI/PERRLA.  No facial swelling or asymmetry noted.  Pt stated that he felt too unwell to walk for me during exam, prior to treatment.    I, Thom Casillas MD, personally made/approved the management plan for this patient and take responsibility for the patient's management.

## 2024-04-06 NOTE — ED ADULT NURSE NOTE - CAS DISCH TRANSFER METHOD
Hemigard Postcare Instructions: The HEMIGARD strips are to remain completely dry for at least 5-7 days. Private car

## 2024-04-06 NOTE — ED PROVIDER NOTE - NSFOLLOWUPINSTRUCTIONS_ED_ALL_ED_FT
Follow up with your PMD within 1-2 days.   Follow up with ENT within the week- you can call: Find a Physician helpline ([phone]) for assistance or call our ENT/Vertigo Clinic in 1-2 days [phone] at [address] [facility] [address] [facility], [place], [address]   Rest, increase your fluids.   Take Meclizine 25mg 1 tab every 8 hrs as needed for dizziness- caution drowsiness when taking this medication and do not drive nor operate heavy machinery.  Take Zofran 4mg dissolve under your tongue every 6 hrs as needed for nausea.   Continue all other medications as previously prescribed.   If you have continued, worsening or ANY new concerning symptoms return to the emergency department.  Worsening, continued or ANY new concerning symptoms return to the emergency department.

## 2024-04-06 NOTE — ED PROVIDER NOTE - CLINICAL SUMMARY MEDICAL DECISION MAKING FREE TEXT BOX
This is a 48-year-old male past medical history hyperlipidemia, (currently not on medication) with complaint on of double vision, headache, dizziness, vomiting x 2  in this morning and unsteady gait. Endorses he did not feel well since Monday, c/o fever chills intermittent shortness of breath. Thursday he went to [facility] he was diagnosed with sinus infection, Augmentin was ordered. Currently taking antibiotics and Tylenol and Motrin over-the-counter with less relief. Denies chest pain, chest discomfort, abdominal pain, urinary symptoms  blood in the urine and stool  slurred speech, numbness, weakness, tingling, sensory deficits.

## 2024-04-06 NOTE — ED PROVIDER NOTE - PROGRESS NOTE DETAILS
GARRY Medrano- pt does not feel any better, additional ordered placed r/o intracranial pathology GARRY Medrano- no improvement additional meds ordered, cta pending MANUELITO Turcios- Took sign out from GARRY Sunshine. CTA head and neck neg. Will DC with rx/s for Meclizine/Zofran, cont. Augmentin and ENT f/u. Strict ED return precautions discussed. Patient understands and agrees. JOSUE:  Patient signed out to me by Dr. Casillas at 1500 pending CT.  CT showing no acute findings.  Patient platelet count 87, 69 two days ago.  Patient with no evidence of bleeding.  Given follow-up information for hematology clinic. MANUELITO Turcios- Took sign out from GARRY Sunshine. CTA head and neck neg for acute stroke or occlusion. Incidental lymphadenopathy noted.  LFTs noted to be elevated. Platelet count low but improved from last visit. Will DC with rx/s for Meclizine/Zofran, cont. Augmentin and ENT and Heme f/u. MANUELITO Turcios- Upon DC, patient still ataxic.  Witnessed patient walking to the bathroom and he was unable to coordinate opening the door. DC vitals revealing temp 100.7. As per family patient "not himself. very out of it. in a fog". Family also stating unusual behavior such as taking large amount of money out of [place] but not knowing why. I paged Neuro for evaluation MANUELITO Turcios- Neuro saw patient- recommends admit for further work up but needs to talk to his attending first. Will empirically treat for meningitis with Acyclovir/Ceftriaxone/Vanco/Decadron. Patient moved to the main room 2 and signed out to Dr. Leyva/Resident Miguel Cross MANUELITO Turcios- Upon DC, patient still ataxic.  Witnessed patient walking to the bathroom and he was unable to coordinate opening the door. DC vitals revealing temp 100.7. As per family patient "not himself. very out of it. in a fog". Family also stating unusual behavior such as taking large amount of money out of [place] but not knowing why. I paged Neuro for evaluation and ordered sepsis labs JOSUE:  On reassessment for discharge patient is confused, disoriented, still with unstable gait which is a change in clinical condition from prior to signout.  Patient also febrile to 100.7.  Given constellation of findings, neurology was consulted.  Resident recommended likely admission, however had to staff case with attending first.  Given increasing confusion and acuity, decision was made to transfer patient to the main ED.  Patient was signed out to Dr. Leyva on red team pending neurology rec results and possible LP. Maria Luisa DE LUNA PGY3: LP performed. will admit to medicine. Maria Luisa DE LUNA PGY3: LP performed. will admit to medicine. spoke to hospitalist who will admit.

## 2024-04-06 NOTE — ED ADULT NURSE NOTE - OBJECTIVE STATEMENT
A&Ox4. ambulatory. c/o bilateral eye redness, pain, blurred vision. NAD. pt denies SOB, chest pain, dizziness, weakness, urinary symptoms, HA, n/v/d, fevers, chills. respirations are even and un labored. skin intact. 20g placed to RAC. labs drawn and sent.

## 2024-04-06 NOTE — ED ADULT NURSE REASSESSMENT NOTE - SYMPTOMS
pt continues to c.o. dizziness, but now is unable to correctly states the date, year or month. pt ambulated to wheelchair to go to xray and gait slow. pt not following commands appropriately. family states he is different than before./dizziness pt continues to c.o. dizziness, but now is unable to correctly state the date, year or month. pt ambulated to wheelchair to go to xray and gait slow. pt not following commands appropriately. family states he is different than before and noticed it when he wanted to go to the bathroom./dizziness

## 2024-04-06 NOTE — CONSULT NOTE ADULT - SUBJECTIVE AND OBJECTIVE BOX
Neurology - Consult Note    -  Spectra: [phone] ([facility]), [phone] ([facility])  -    HPI: Patient GAURANG TYLER is a 48y (1975) wo/man with a PMHx significant for ***    Allergies:  No Known Allergies      PMHx/PSHx/Family Hx: As above, otherwise see below   HLD (hyperlipidemia)        Social Hx:  No current use of tobacco, alcohol, or illicit drugs  Lives with ***    Medications:  MEDICATIONS  (STANDING):    MEDICATIONS  (PRN):      Vitals:  T(C): 38.2 (04-06-24 @ 19:06), Max: 38.2 (04-06-24 @ 19:06)  HR: 104 (04-06-24 @ 19:06) (100 - 112)  BP: 123/84 (04-06-24 @ 19:06) (113/82 - 123/84)  RR: 17 (04-06-24 @ 19:06) (16 - 18)  SpO2: 100% (04-06-24 @ 19:06) (95% - 106%)    Physical Examination:  General - NAD  Cardiovascular - no edema  Eyes - Fundoscopy not well visualized  Neck: No neck rigidity, full ROM    Neurologic Exam:  Mental status - Awake, will attend to examiner if instructed but easily distracted. Oriented to self and birthday, not location, year, month, date, or day of week. Speech fluent with intact repetition, comprehension appears impaired at times (initially answered with his phone number when asked where he was), names pen but not ID badge. Very poor attention, cannot name [place] president, registers 3/3 objects but recalls 0/3    Cranial nerves - PERRL, VFF, EOMI, face sensation (V1-V3) intact b/l, facial strength intact without asymmetry b/l, hearing intact b/l, palate with symmetric elevation, trapezius 5/5 strength b/l, tongue midline on protrusion with full lateral movement    Motor - Normal bulk, slightly increased tone vs paratonia i/s/o resisting examiner    Strength testing            Deltoid      Biceps      Triceps     Wrist Extension    Wrist Flexion     Interossei         R            5                 5               5                     5                5                        5                 5  L             5                 5               5                     5                 5                        5                 5              Hip Flexion    Hip Extension    Knee Flexion    Knee Extension    Dorsiflexion    Plantar Flexion  R              5                           5                       5                5                     5                          5  L              5                           5                        5               5                     5                          5    Sensation - Intact to LT and pain b/l UE and LE    DTR's -             Biceps      Triceps     Brachioradialis      Patellar    Ankle    Toes/plantar response  R             1+            1+                  1+                  1+        1+                Mute  L             1+            1+                  1+                  1+        1+                Mute    Coordination - Unable to complete full FTN or HTS due to mental status, but when touching nose with finger b/l, did not appear to be gross dysmetria    Gait and station - Retropulsion with Romberg. Uses IV pole to ambulate.    Labs:                        15.2   7.88  )-----------( 87       ( 06 Apr 2024 12:46 )             44.4     04-06    134<L>  |  97<L>  |  17  ----------------------------<  132<H>  3.7   |  23  |  0.99    Ca    8.8      06 Apr 2024 12:46    TPro  7.4  /  Alb  3.1<L>  /  TBili  0.7  /  DBili  x   /  AST  105<H>  /  ALT  75<H>  /  AlkPhos  187<H>  04-06    CAPILLARY BLOOD GLUCOSE      POCT Blood Glucose.: 129 mg/dL (06 Apr 2024 11:51)    LIVER FUNCTIONS - ( 06 Apr 2024 12:46 )  Alb: 3.1 g/dL / Pro: 7.4 g/dL / ALK PHOS: 187 U/L / ALT: 75 U/L / AST: 105 U/L / GGT: x               CSF:                  Radiology:  CT Head No Cont:  (04 Apr 2024 16:45)     Neurology - Consult Note    -  Spectra: [phone] ([facility]), [phone] ([facility])  -    HPI: 48M pmhx HLD (on statin), presents with multiple neurologic issues, including HA, double vision, gait instability, and (most recently) altered mental status. Pt accompanied by family at bedside and sister Aicha called in as well. Pt first started endorsing HA on Monday - felt like b/l pressure in sinus + behind eyes, a/w nausea, no photo/phonophobia, nonpositional. Throughout the week he has been barely eating. On Wednesday he had an episode of "passing out" where he lost consciousness, fell to the ground (no convulsions, no TB, no bowel/bladder incontinence), and woke up within 10-15 seconds. Pt has also been having gait unsteadiness (using items/walls to help navigate, normally has no issues with ambulation at baseline) and "dizziness" ( pt unable to specify if room spinning). Pt also with fever and chills (in [facility] ED, TMax 100.7)    Pt initially presented to [facility] on Thursday 4/4, where he was diagnosed with a sinus infection and given Augmentin, which he took at home. However, pt's symptoms did not improve - at some point after, he had a transient episode of vertical diplopia that lasted one hour (unable to tell me if the diplopia went away with one eye closed).     Pt came to [facility] on Saturday 4/6 because of the aforementioned history - prior to his arrival at roughly 12 noon on 4/6, pt's mental status and cognitive abilities were reportedly baseline per family. However, during the many hours that he has been in the emergency room, he has started to have significant change in mental status - normally he is a functional, independent individual without significant issues in interpersonal communication, and now he is disoriented, has significant issues with attention and concentration, and engages in strange behaviors (swiping on phone screen despite nothing being there). Pt also reports a Pt denies AH/VH/SI/HI.     Allergies:  No Known Allergies      PMHx/PSHx/Family Hx: As above, otherwise see below   HLD (hyperlipidemia)        Social Hx:  No current use of tobacco, alcohol, or illicit drugs  Lives with ***    Medications:  MEDICATIONS  (STANDING):    MEDICATIONS  (PRN):      Vitals:  T(C): 38.2 (04-06-24 @ 19:06), Max: 38.2 (04-06-24 @ 19:06)  HR: 104 (04-06-24 @ 19:06) (100 - 112)  BP: 123/84 (04-06-24 @ 19:06) (113/82 - 123/84)  RR: 17 (04-06-24 @ 19:06) (16 - 18)  SpO2: 100% (04-06-24 @ 19:06) (95% - 106%)    Physical Examination:  General - NAD  Cardiovascular - no edema  Eyes - Fundoscopy not well visualized  Neck: No neck rigidity, full ROM    Neurologic Exam:  Mental status - Awake, will attend to examiner if instructed but easily distracted. Oriented to self and birthday, not location, year, month, date, or day of week. Speech fluent with intact repetition, comprehension appears impaired at times (initially answered with his phone number when asked where he was), names pen but not ID badge. Very poor attention, cannot name [place] president, registers 3/3 objects but recalls 0/3    Cranial nerves - PERRL, VFF, EOMI, face sensation (V1-V3) intact b/l, facial strength intact without asymmetry b/l, hearing intact b/l, palate with symmetric elevation, trapezius 5/5 strength b/l, tongue midline on protrusion with full lateral movement    Motor - Normal bulk, slightly increased tone vs paratonia i/s/o resisting examiner    Strength testing            Deltoid      Biceps      Triceps     Wrist Extension    Wrist Flexion     Interossei         R            5                 5               5                     5                5                        5                 5  L             5                 5               5                     5                 5                        5                 5              Hip Flexion    Hip Extension    Knee Flexion    Knee Extension    Dorsiflexion    Plantar Flexion  R              5                           5                       5                5                     5                          5  L              5                           5                        5               5                     5                          5    Sensation - Intact to LT and pain b/l UE and LE    DTR's -             Biceps      Triceps     Brachioradialis      Patellar    Ankle    Toes/plantar response  R             1+            1+                  1+                  1+        1+                Mute  L             1+            1+                  1+                  1+        1+                Mute    Coordination - Unable to complete full FTN or HTS due to mental status, but when touching nose with finger b/l, did not appear to be gross dysmetria    Gait and station - Retropulsion with Romberg. Uses IV pole to ambulate.    Labs:                        15.2   7.88  )-----------( 87       ( 06 Apr 2024 12:46 )             44.4     04-06    134<L>  |  97<L>  |  17  ----------------------------<  132<H>  3.7   |  23  |  0.99    Ca    8.8      06 Apr 2024 12:46    TPro  7.4  /  Alb  3.1<L>  /  TBili  0.7  /  DBili  x   /  AST  105<H>  /  ALT  75<H>  /  AlkPhos  187<H>  04-06    CAPILLARY BLOOD GLUCOSE      POCT Blood Glucose.: 129 mg/dL (06 Apr 2024 11:51)    LIVER FUNCTIONS - ( 06 Apr 2024 12:46 )  Alb: 3.1 g/dL / Pro: 7.4 g/dL / ALK PHOS: 187 U/L / ALT: 75 U/L / AST: 105 U/L / GGT: x               CSF:                  Radiology:  CT Head No Cont:  (04 Apr 2024 16:45)

## 2024-04-06 NOTE — ED ADULT NURSE NOTE - CHIEF COMPLAINT QUOTE
Patient went to [facility] for eye pain x 5 days, was discharged from their ED. Starting today complaining of dizziness and double vision. PMHX- HLD. Patient noted with left eye redness. Patient also states he hasn't taken cholesterol med in a year. Breathing non-labored. Denies CP, SOB.

## 2024-04-06 NOTE — ED ADULT NURSE REASSESSMENT NOTE - NS ED NURSE REASSESS COMMENT FT1
BREAK RN: Pt currently A&Ox2, unable to follow basic commands at this time. MANUELITO Flores aware. Neuro at bedside for exam. 20G IV placed to R AC. Labs obtained. Medicated as per EMR orders. Family at bedside states pt is not at baseline at this time. Vitals performed. Safety maintained. BREAK RN: Pt currently A&Ox2, unable to follow basic commands at this time. MANUELITO Flores aware. Neuro at bedside for exam. 20G IV placed to L AC. Labs obtained. Medicated as per EMR orders. Family at bedside states pt is not at baseline at this time. Vitals performed. Safety maintained.

## 2024-04-06 NOTE — ED PROVIDER NOTE - CONSIDERATION OF ADMISSION OBSERVATION
Consideration of Admission/Observation due to deterioration at time of discharge, pt admitted for ataxia and AMS

## 2024-04-06 NOTE — ED ADULT NURSE REASSESSMENT NOTE - NS ED NURSE REASSESS COMMENT FT1
Received pt as an upgrade from results waiting for new onset ataxia, confusion and gait instability in the setting of a fever. On assessment, pt is alert and oriented x 3, but easily distracted with poor attention span. No neuro deficits noted. Patient placed on droplet precautions to r/o meningitis, providers are in room performing an LP. Antibiotics in progress. Pending further dispo and likely admission.

## 2024-04-06 NOTE — ED ADULT NURSE NOTE - NS ED NOTE ABUSE RESPONSE YN
test negative       C difficile Toxin, EIA --    Result: C Difficile Toxins A and B not detected         Radiology :     Chest X ray     CTA scan of chest -     1.  There is no evidence for pulmonary embolic disease.    2.  Worsening bilateral infiltrates versus atelectasis    3.  New small left pleural effusion     CT abdomen and pelvis -       No acute findings.        IMPRESSION:      1. Post influenza pneumonia ( MRSA )   2 . Leukocytosis   3. COPDE      RECOMMENDATIONS:       1. Zyvox 600 mg po q 12 hrs ~ 9 days   2. CBC with diff out pt   3. Follow up in 7-10 days        12:27 PM      3/12/2018 Yes

## 2024-04-06 NOTE — ED PROVIDER NOTE - PATIENT PORTAL LINK FT
HISTORY OF PRESENT ILLNESS  Kavya Andrews is a 72 y.o. male. HPI  Seen for med check. He wants to review some preventive issues. He has had a Pneumovax. Order provided for Prevnar. He has had his flu shot. He is due for PSA and this will be ordered. He is due for lipids. Taking Zocor. He is encouraged to schedule for a wellness visit as well. Review of Systems   Constitutional: Negative for chills, fever and weight loss. Respiratory: Negative for cough, shortness of breath and wheezing. Cardiovascular: Negative for chest pain, palpitations, orthopnea, leg swelling and PND. Gastrointestinal: Negative for heartburn and nausea. Musculoskeletal: Negative for myalgias. Neurological: Negative for dizziness and headaches. Physical Exam   Constitutional: He is oriented to person, place, and time. He appears well-developed and well-nourished. HENT:   Head: Normocephalic and atraumatic. Neck: Normal range of motion. Neck supple. Carotid bruit is not present. No thyromegaly present. Cardiovascular: Normal rate, regular rhythm, normal heart sounds and intact distal pulses. Pulmonary/Chest: Effort normal and breath sounds normal. No respiratory distress. He has no wheezes. He has no rales. Musculoskeletal: He exhibits no edema. Neurological: He is alert and oriented to person, place, and time. Psychiatric: He has a normal mood and affect. His behavior is normal.   Nursing note and vitals reviewed. ASSESSMENT and PLAN  Diagnoses and all orders for this visit:    1. Mixed hyperlipidemia-on zocor  -     METABOLIC PANEL, COMPREHENSIVE  -     LIPID PANEL    2. General medical exam  -     HEPATITIS C AB    3.  Prostate cancer screening  -     PSA, DIAGNOSTIC (PROSTATE SPECIFIC AG)
Notified of inc in cr-stop nsaids and appt in 1mo
You can access the FollowMyHealth Patient Portal offered by [facility] by registering at the following website: [email or web address]. By joining [facility]’s FollowMyHealth portal, you will also be able to view your health information using other applications (apps) compatible with our system.

## 2024-04-06 NOTE — ED ADULT TRIAGE NOTE - CHIEF COMPLAINT QUOTE
Patient went to [facility] for eye pain x 5 days, was discharged from their ED. Starting today complaining of dizziness and double vision. PMHX- HLD. Patient noted with left eye redness. Patient also states he hasn't taken cholesterol med in a year. Breathing non-labored. Denies CP, SOB.

## 2024-04-06 NOTE — ED ADULT NURSE NOTE - NSICDXPASTMEDICALHX_GEN_ALL_CORE_FT
Goal Outcome Evaluation:  Plan of Care Reviewed With: patient        Progress: improving  Outcome Evaluation: vss. pt rested well over shift. remains on 2l o2. pt and family decided to dc to rehab. need to find placement.   PAST MEDICAL HISTORY:  HLD (hyperlipidemia)

## 2024-04-07 DIAGNOSIS — A41.9 SEPSIS, UNSPECIFIED ORGANISM: ICD-10-CM

## 2024-04-07 DIAGNOSIS — E78.5 HYPERLIPIDEMIA, UNSPECIFIED: ICD-10-CM

## 2024-04-07 DIAGNOSIS — Z29.9 ENCOUNTER FOR PROPHYLACTIC MEASURES, UNSPECIFIED: ICD-10-CM

## 2024-04-07 DIAGNOSIS — D69.6 THROMBOCYTOPENIA, UNSPECIFIED: ICD-10-CM

## 2024-04-07 DIAGNOSIS — R74.01 ELEVATION OF LEVELS OF LIVER TRANSAMINASE LEVELS: ICD-10-CM

## 2024-04-07 LAB
CSF PCR RESULT: SIGNIFICANT CHANGE UP
GRAM STN FLD: SIGNIFICANT CHANGE UP
SPECIMEN SOURCE: SIGNIFICANT CHANGE UP

## 2024-04-07 PROCEDURE — 70553 MRI BRAIN STEM W/O & W/DYE: CPT | Mod: 26

## 2024-04-07 PROCEDURE — 99223 1ST HOSP IP/OBS HIGH 75: CPT | Mod: GC

## 2024-04-07 PROCEDURE — 99255 IP/OBS CONSLTJ NEW/EST HI 80: CPT | Mod: GC

## 2024-04-07 PROCEDURE — 76705 ECHO EXAM OF ABDOMEN: CPT | Mod: 26

## 2024-04-07 RX ORDER — ACYCLOVIR SODIUM 500 MG
700 VIAL (EA) INTRAVENOUS EVERY 8 HOURS
Refills: 0 | Status: DISCONTINUED | OUTPATIENT
Start: 2024-04-07 | End: 2024-04-07

## 2024-04-07 RX ORDER — CEFTRIAXONE 500 MG/1
2000 INJECTION, POWDER, FOR SOLUTION INTRAMUSCULAR; INTRAVENOUS EVERY 12 HOURS
Refills: 0 | Status: DISCONTINUED | OUTPATIENT
Start: 2024-04-07 | End: 2024-04-07

## 2024-04-07 RX ORDER — VANCOMYCIN HCL 1 G
1500 VIAL (EA) INTRAVENOUS EVERY 12 HOURS
Refills: 0 | Status: DISCONTINUED | OUTPATIENT
Start: 2024-04-07 | End: 2024-04-07

## 2024-04-07 RX ORDER — VANCOMYCIN HCL 1 G
1750 VIAL (EA) INTRAVENOUS EVERY 12 HOURS
Refills: 0 | Status: DISCONTINUED | OUTPATIENT
Start: 2024-04-07 | End: 2024-04-07

## 2024-04-07 RX ORDER — ACYCLOVIR SODIUM 500 MG
700 VIAL (EA) INTRAVENOUS ONCE
Refills: 0 | Status: DISCONTINUED | OUTPATIENT
Start: 2024-04-07 | End: 2024-04-07

## 2024-04-07 RX ORDER — ACETAMINOPHEN 500 MG
650 TABLET ORAL EVERY 6 HOURS
Refills: 0 | Status: DISCONTINUED | OUTPATIENT
Start: 2024-04-07 | End: 2024-04-11

## 2024-04-07 RX ORDER — SODIUM CHLORIDE 9 MG/ML
1000 INJECTION INTRAMUSCULAR; INTRAVENOUS; SUBCUTANEOUS
Refills: 0 | Status: DISCONTINUED | OUTPATIENT
Start: 2024-04-07 | End: 2024-04-10

## 2024-04-07 RX ORDER — ACYCLOVIR SODIUM 500 MG
VIAL (EA) INTRAVENOUS
Refills: 0 | Status: DISCONTINUED | OUTPATIENT
Start: 2024-04-07 | End: 2024-04-09

## 2024-04-07 RX ORDER — ACYCLOVIR SODIUM 500 MG
VIAL (EA) INTRAVENOUS
Refills: 0 | Status: DISCONTINUED | OUTPATIENT
Start: 2024-04-07 | End: 2024-04-07

## 2024-04-07 RX ORDER — ACYCLOVIR SODIUM 500 MG
700 VIAL (EA) INTRAVENOUS ONCE
Refills: 0 | Status: COMPLETED | OUTPATIENT
Start: 2024-04-07 | End: 2024-04-07

## 2024-04-07 RX ORDER — VANCOMYCIN HCL 1 G
500 VIAL (EA) INTRAVENOUS ONCE
Refills: 0 | Status: COMPLETED | OUTPATIENT
Start: 2024-04-07 | End: 2024-04-07

## 2024-04-07 RX ORDER — ONDANSETRON 8 MG/1
4 TABLET, FILM COATED ORAL EVERY 8 HOURS
Refills: 0 | Status: DISCONTINUED | OUTPATIENT
Start: 2024-04-07 | End: 2024-04-11

## 2024-04-07 RX ORDER — DEXAMETHASONE 0.5 MG/5ML
10 ELIXIR ORAL EVERY 6 HOURS
Refills: 0 | Status: DISCONTINUED | OUTPATIENT
Start: 2024-04-07 | End: 2024-04-07

## 2024-04-07 RX ORDER — ACYCLOVIR SODIUM 500 MG
700 VIAL (EA) INTRAVENOUS EVERY 8 HOURS
Refills: 0 | Status: DISCONTINUED | OUTPATIENT
Start: 2024-04-07 | End: 2024-04-09

## 2024-04-07 RX ORDER — SODIUM CHLORIDE 9 MG/ML
1000 INJECTION INTRAMUSCULAR; INTRAVENOUS; SUBCUTANEOUS
Refills: 0 | Status: DISCONTINUED | OUTPATIENT
Start: 2024-04-07 | End: 2024-04-07

## 2024-04-07 RX ORDER — VANCOMYCIN HCL 1 G
750 VIAL (EA) INTRAVENOUS ONCE
Refills: 0 | Status: DISCONTINUED | OUTPATIENT
Start: 2024-04-07 | End: 2024-04-07

## 2024-04-07 RX ADMIN — SODIUM CHLORIDE 100 MILLILITER(S): 9 INJECTION INTRAMUSCULAR; INTRAVENOUS; SUBCUTANEOUS at 03:20

## 2024-04-07 RX ADMIN — Medication 264 MILLIGRAM(S): at 15:41

## 2024-04-07 RX ADMIN — Medication 100 MILLIGRAM(S): at 06:21

## 2024-04-07 RX ADMIN — Medication 264 MILLIGRAM(S): at 21:06

## 2024-04-07 RX ADMIN — Medication 264 MILLIGRAM(S): at 07:58

## 2024-04-07 RX ADMIN — CEFTRIAXONE 100 MILLIGRAM(S): 500 INJECTION, POWDER, FOR SOLUTION INTRAMUSCULAR; INTRAVENOUS at 05:05

## 2024-04-07 RX ADMIN — Medication 250 MILLIGRAM(S): at 03:45

## 2024-04-07 NOTE — PROGRESS NOTE ADULT - SUBJECTIVE AND OBJECTIVE BOX
*******************************  Edward Sims, PGY-1  Internal Medicine  Contact via [employer] TEAMS    *******************************    PROGRESS NOTE:     Patient is a 48y old  Male who presents with a chief complaint of fever (07 Apr 2024 02:14)      INTERVAL EVENTS: No acute overnight events.     SUBJECTIVE: Patient seen and examined at bedside. This morning, the patient is comfortable and doing well. No acute complaints. Denies fevers, chills, N/V/D, chest pain, SOB, abdominal pain.    MEDICATIONS  (STANDING):  acyclovir IVPB      acyclovir IVPB 700 milliGRAM(s) IV Intermittent once  acyclovir IVPB 700 milliGRAM(s) IV Intermittent every 8 hours  cefTRIAXone   IVPB 2000 milliGRAM(s) IV Intermittent every 12 hours  sodium chloride 0.9%. 1000 milliLiter(s) (100 mL/Hr) IV Continuous <Continuous>  vancomycin  IVPB 1500 milliGRAM(s) IV Intermittent every 12 hours    MEDICATIONS  (PRN):  acetaminophen     Tablet .. 650 milliGRAM(s) Oral every 6 hours PRN Temp greater or equal to 38C (100.4F), Mild Pain (1 - 3)  aluminum hydroxide/magnesium hydroxide/simethicone Suspension 30 milliLiter(s) Oral every 4 hours PRN Dyspepsia  ondansetron Injectable 4 milliGRAM(s) IV Push every 8 hours PRN Nausea and/or Vomiting      CAPILLARY BLOOD GLUCOSE      POCT Blood Glucose.: 129 mg/dL (06 Apr 2024 11:51)    I&O's Summary      PHYSICAL EXAM:  Vital Signs Last 24 Hrs  T(C): 36.6 (07 Apr 2024 06:25), Max: 38.3 (06 Apr 2024 20:12)  T(F): 97.8 (07 Apr 2024 06:25), Max: 100.9 (06 Apr 2024 20:12)  HR: 84 (07 Apr 2024 06:25) (84 - 112)  BP: 126/79 (07 Apr 2024 06:25) (113/82 - 127/88)  BP(mean): --  RR: 17 (07 Apr 2024 06:25) (16 - 18)  SpO2: 98% (07 Apr 2024 06:25) (95% - 106%)    Parameters below as of 07 Apr 2024 06:25  Patient On (Oxygen Delivery Method): room air        GENERAL: NAD, lying in bed comfortably  HEAD: Atraumatic, normocephalic  EYES: EOMI, PERRLA, conjunctiva and sclera clear  ENT: Moist mucous membranes  NECK: Supple, no JVD  HEART: S1, S2, Regular rate and rhythm, no murmurs, rubs, or gallops  LUNGS: Unlabored respirations, clear to auscultation bilaterally, no crackles, wheezing, or rhonchi  ABDOMEN: Soft, nontender, nondistended, +BS  EXTREMITIES: 2+ peripheral pulses bilaterally. No clubbing, cyanosis, or edema  NERVOUS SYSTEM:  A&Ox3, no focal deficits   SKIN: No rashes or lesions    LABS:                        15.2   7.88  )-----------( 87       ( 06 Apr 2024 12:46 )             44.4     04-06    134<L>  |  97<L>  |  17  ----------------------------<  132<H>  3.7   |  23  |  0.99    Ca    8.8      06 Apr 2024 12:46    TPro  7.4  /  Alb  3.1<L>  /  TBili  0.7  /  DBili  x   /  AST  105<H>  /  ALT  75<H>  /  AlkPhos  187<H>  04-06    PT/INR - ( 06 Apr 2024 19:35 )   PT: 12.8 sec;   INR: 1.14 ratio         PTT - ( 06 Apr 2024 19:35 )  PTT:34.4 sec      Urinalysis Basic - ( 06 Apr 2024 12:46 )    Color: x / Appearance: x / SG: x / pH: x  Gluc: 132 mg/dL / Ketone: x  / Bili: x / Urobili: x   Blood: x / Protein: x / Nitrite: x   Leuk Esterase: x / RBC: x / WBC x   Sq Epi: x / Non Sq Epi: x / Bacteria: x        Culture - CSF with Gram Stain (collected 06 Apr 2024 21:32)  Source: .CSF CSF  Gram Stain (07 Apr 2024 00:47):    polymorphonuclear leukocytes seen    No organisms seen    by cytocentrifuge        RADIOLOGY & ADDITIONAL TESTS:  Results Reviewed:   Imaging Personally Reviewed:  Electrocardiogram Personally Reviewed:  Tele: *******************************  Edward Sims, PGY-1  Internal Medicine  Contact via [employer] TEAMS    *******************************    PROGRESS NOTE:     Patient is a 48y old  Male who presents with a chief complaint of fever (07 Apr 2024 02:14)      INTERVAL EVENTS: No acute overnight events.     SUBJECTIVE: Patient seen and examined at bedside. This morning, the patient is comfortable and doing well. No acute complaints. Denies fevers, chills, N/V/D, chest pain, SOB, abdominal pain.     MEDICATIONS  (STANDING):  acyclovir IVPB      acyclovir IVPB 700 milliGRAM(s) IV Intermittent once  acyclovir IVPB 700 milliGRAM(s) IV Intermittent every 8 hours  cefTRIAXone   IVPB 2000 milliGRAM(s) IV Intermittent every 12 hours  sodium chloride 0.9%. 1000 milliLiter(s) (100 mL/Hr) IV Continuous <Continuous>  vancomycin  IVPB 1500 milliGRAM(s) IV Intermittent every 12 hours    MEDICATIONS  (PRN):  acetaminophen     Tablet .. 650 milliGRAM(s) Oral every 6 hours PRN Temp greater or equal to 38C (100.4F), Mild Pain (1 - 3)  aluminum hydroxide/magnesium hydroxide/simethicone Suspension 30 milliLiter(s) Oral every 4 hours PRN Dyspepsia  ondansetron Injectable 4 milliGRAM(s) IV Push every 8 hours PRN Nausea and/or Vomiting      CAPILLARY BLOOD GLUCOSE      POCT Blood Glucose.: 129 mg/dL (06 Apr 2024 11:51)    I&O's Summary      PHYSICAL EXAM:  Vital Signs Last 24 Hrs  T(C): 36.6 (07 Apr 2024 06:25), Max: 38.3 (06 Apr 2024 20:12)  T(F): 97.8 (07 Apr 2024 06:25), Max: 100.9 (06 Apr 2024 20:12)  HR: 84 (07 Apr 2024 06:25) (84 - 112)  BP: 126/79 (07 Apr 2024 06:25) (113/82 - 127/88)  BP(mean): --  RR: 17 (07 Apr 2024 06:25) (16 - 18)  SpO2: 98% (07 Apr 2024 06:25) (95% - 106%)    Parameters below as of 07 Apr 2024 06:25  Patient On (Oxygen Delivery Method): room air        GENERAL: NAD, lying in bed comfortably  HEAD: Atraumatic, normocephalic  EYES: EOMI, conjunctiva and sclera clear  ENT: Moist mucous membranes  NECK: Supple, no JVD  HEART: S1, S2, Regular rate and rhythm, no murmurs, rubs, or gallops  LUNGS: Unlabored respirations, clear to auscultation bilaterally, no crackles, wheezing, or rhonchi  ABDOMEN: Soft, nontender  EXTREMITIES: No LE edema  NERVOUS SYSTEM:  A&Ox3, no focal deficits, CNs normal, sensation intact grossly, motor intact grossly, heel-to-shin normal, finger-to-nose normal, negative kernig and brudzinski  SKIN: No rashes or lesions    LABS:                        15.2   7.88  )-----------( 87       ( 06 Apr 2024 12:46 )             44.4     04-06    134<L>  |  97<L>  |  17  ----------------------------<  132<H>  3.7   |  23  |  0.99    Ca    8.8      06 Apr 2024 12:46    TPro  7.4  /  Alb  3.1<L>  /  TBili  0.7  /  DBili  x   /  AST  105<H>  /  ALT  75<H>  /  AlkPhos  187<H>  04-06    PT/INR - ( 06 Apr 2024 19:35 )   PT: 12.8 sec;   INR: 1.14 ratio         PTT - ( 06 Apr 2024 19:35 )  PTT:34.4 sec      Urinalysis Basic - ( 06 Apr 2024 12:46 )    Color: x / Appearance: x / SG: x / pH: x  Gluc: 132 mg/dL / Ketone: x  / Bili: x / Urobili: x   Blood: x / Protein: x / Nitrite: x   Leuk Esterase: x / RBC: x / WBC x   Sq Epi: x / Non Sq Epi: x / Bacteria: x        Culture - CSF with Gram Stain (collected 06 Apr 2024 21:32)  Source: .CSF CSF  Gram Stain (07 Apr 2024 00:47):    polymorphonuclear leukocytes seen    No organisms seen    by cytocentrifuge        RADIOLOGY & ADDITIONAL TESTS:  Results Reviewed:   Imaging Personally Reviewed:  Electrocardiogram Personally Reviewed:  Tele:

## 2024-04-07 NOTE — CONSULT NOTE ADULT - CONSULT REASON
Multiple Neurologic Complaints, including HA, double vision, gait instability, altered mental status
r/o meningitis

## 2024-04-07 NOTE — H&P ADULT - PROBLEM SELECTOR PLAN 1
- Patient presenting with fever, AMS, focal neuro symptoms of ataxia, behavioral change suggestive of encephalitis. No meningeal sign on exam    - CSF studies with glucose 67, protein 64, WBC 58  with lymphotic predominance suggestive of viral etiology, less likely bacterial although could be early bacterial etiology      Plan:   - Follow up bcx and infectious CSF studies including cultures, HSV PCR, lyme, WNV  - Serum encephalopathy work up ordered   - MRI brain w/wo IV contrast   - vEEG  - Seizure and fall precautions   - Empiric treatment with vanco (20 mg/kg q12h) , ceftriaxone (2g q12h) , and acyclovir (10mg/kg q8h) given concern for HSV encephalitis  - ID consult in the AM - Patient presenting with fever, AMS, focal neuro symptoms of ataxia, behavioral change suggestive of encephalitis. No meningeal sign on exam    - CSF studies with glucose 67, protein 64, WBC 58  with lymphotic predominance suggestive of viral etiology, less likely bacterial although could be early bacterial etiology      Plan:   - Follow up bcx and infectious CSF studies including cultures, HSV PCR, lyme, [place]   - Serum encephalopathy work up ordered   - MRI brain w/wo IV contrast   - vEEG  - Seizure and fall precautions   - Empiric treatment with vanco (20 mg/kg q12h) , ceftriaxone (2g q12h) , and acyclovir (10mg/kg q8h) given concern for HSV encephalitis  - ID consult in the AM

## 2024-04-07 NOTE — H&P ADULT - NSHPPHYSICALEXAM_GEN_ALL_CORE
PHYSICAL EXAM:   GENERAL: Alert. No acute distress. Appears tired.   HEAD:  Atraumatic. Normocephalic.  EYES: EOMI. PERRLA. Normal conjunctiva/sclera.  ENT: Neck supple. No JVD.    LYMPH: Normal supraclavicular/cervical lymph nodes.   CARDIAC: Regular rate and rhythm.  S1. S2. No murmur.   LUNG/CHEST: CTAB. BS equal bilaterally.  ABDOMEN: Soft. No tenderness. No distension.  BACK: No midline/vertebral tenderness. No flank tenderness.  EXTREMITIES:  No edema. Moving all 4.  NEUROLOGY: A&Ox3. Non-focal exam. Normal motor strength and sensation. No nugal rigidity. Negative Brudzinski and Kernig sign. Follows commands but slow to respond and requires repetition   SKIN: No jaundice. No erythema. No rashes or lesions   Vascular Access:     ICU Vital Signs Last 24 Hrs  T(C): 37.3 (07 Apr 2024 01:28), Max: 38.3 (06 Apr 2024 20:12)  T(F): 99.2 (07 Apr 2024 01:28), Max: 100.9 (06 Apr 2024 20:12)  HR: 89 (07 Apr 2024 01:28) (89 - 112)  BP: 127/88 (07 Apr 2024 01:28) (113/82 - 127/88)  RR: 18 (07 Apr 2024 01:28) (16 - 18)  SpO2: 100% (07 Apr 2024 01:28) (95% - 106%)    O2 Parameters below as of 07 Apr 2024 01:28  Patient On (Oxygen Delivery Method): room air PHYSICAL EXAM:   GENERAL: Alert. No acute distress. Appears tired.   HEAD:  Atraumatic. Normocephalic.  EYES: EOMI. PERRLA. Normal conjunctiva/sclera.  ENT: Neck supple. No JVD.    LYMPH: Normal supraclavicular/cervical lymph nodes.   CARDIAC: Regular rate and rhythm.  S1. S2. No murmur.   LUNG/CHEST: CTAB. BS equal bilaterally.  ABDOMEN: Soft. No tenderness. No distension.  BACK: No midline/vertebral tenderness. No flank tenderness.  EXTREMITIES:  No edema. Moving all 4.  NEUROLOGY: A&Ox3.  Normal motor strength and sensation. No nugal rigidity. Negative Brudzinski and Kernig sign. Follows commands but slow to respond and requires repetition   SKIN: No jaundice. No erythema. No rashes or lesions   Vascular Access:     ICU Vital Signs Last 24 Hrs  T(C): 37.3 (07 Apr 2024 01:28), Max: 38.3 (06 Apr 2024 20:12)  T(F): 99.2 (07 Apr 2024 01:28), Max: 100.9 (06 Apr 2024 20:12)  HR: 89 (07 Apr 2024 01:28) (89 - 112)  BP: 127/88 (07 Apr 2024 01:28) (113/82 - 127/88)  RR: 18 (07 Apr 2024 01:28) (16 - 18)  SpO2: 100% (07 Apr 2024 01:28) (95% - 106%)    O2 Parameters below as of 07 Apr 2024 01:28  Patient On (Oxygen Delivery Method): room air PHYSICAL EXAM:   GENERAL: Alert. No acute distress. Appears tired.   HEAD:  Atraumatic. Normocephalic.  EYES: EOMI. PERRL. Normal conjunctiva/sclera.  ENT: Neck supple. No JVD.    LYMPH: Normal supraclavicular/cervical lymph nodes.   CARDIAC: Regular rate and rhythm.  S1. S2. No murmur.   LUNG/CHEST: CTAB. BS equal bilaterally.  ABDOMEN: Soft. No tenderness. No distension.  BACK: No midline/vertebral tenderness. No flank tenderness.  EXTREMITIES:  No edema. Moving all 4.  NEUROLOGY: A&Ox3.  Normal motor strength and sensation. No nugal rigidity. Negative Brudzinski and Kernig sign. Follows commands but slow to respond and requires repetition   SKIN: No jaundice. No erythema. No rashes or lesions   Vascular Access:     ICU Vital Signs Last 24 Hrs  T(C): 37.3 (07 Apr 2024 01:28), Max: 38.3 (06 Apr 2024 20:12)  T(F): 99.2 (07 Apr 2024 01:28), Max: 100.9 (06 Apr 2024 20:12)  HR: 89 (07 Apr 2024 01:28) (89 - 112)  BP: 127/88 (07 Apr 2024 01:28) (113/82 - 127/88)  RR: 18 (07 Apr 2024 01:28) (16 - 18)  SpO2: 100% (07 Apr 2024 01:28) (95% - 106%)    O2 Parameters below as of 07 Apr 2024 01:28  Patient On (Oxygen Delivery Method): room air

## 2024-04-07 NOTE — PATIENT PROFILE ADULT - FALL HARM RISK - HARM RISK INTERVENTIONS
Assistance OOB with selected safe patient handling equipment/Communicate Risk of Fall with Harm to all staff/Discuss with provider need for PT consult/Monitor for mental status changes/Monitor gait and stability/Move patient closer to nurses' station/Provide patient with walking aids - walker, cane, crutches/Reinforce activity limits and safety measures with patient and family/Reorient to person, place and time as needed/Tailored Fall Risk Interventions/Toileting schedule using arm’s reach rule for commode and bathroom/Use of alarms - bed, chair and/or voice tab/Visual Cue: Yellow wristband and red socks/Bed in lowest position, wheels locked, appropriate side rails in place/Call bell, personal items and telephone in reach/Instruct patient to call for assistance before getting out of bed or chair/Non-slip footwear when patient is out of bed/[employer] to call system/Physically safe environment - no spills, clutter or unnecessary equipment/Purposeful Proactive Rounding/Room/bathroom lighting operational, light cord in reach

## 2024-04-07 NOTE — CONSULT NOTE ADULT - ASSESSMENT
Impression/Hospital Course:  48-year-old male PMH of HLD (currently not on medication) presenting with complaints of headache, vomiting, and unsteady gait. Patient states that he did not feel well since Monday, reported pressure in his sinuses He went to [facility] on Thursday where he was diagnosed with sinusitis and was prescribed augmentin. He took the medication that night and the next morning, without any relief and was experiencing fever and chills so he came to [facility]. He feels weak and reports feeling foggy, tired, and sleepy. Endorsed headache and photophobia.   Family also noted he was having unstable gait holding onto objects and wall to navigate which is off from his baseline. In the ED he started having change in mental status, became more disoriented with trouble concentrating and attention. He was also found to be ataxic, having difficulty coordinating movements along with febrile to Tmax of 100.9 and tachycardic. He underwent LP and which showed 64 protein, 67 glucose, 58 WBC with 49% lymphocytes and 43% monocytes with 11RBC. Imaging performed was a CTA Head and neck which showed cervical lymphadenopathy but not other acute findings and CXR which was negative. A CT head was performed [facility] which was negative.     Antimicrobials:  vancomycin 4/6 - current  Ceftriaxone 4/6 - current  acyclovir 4/6 - current    Assessment:  *SIRS as evidenced by pyrexia and tachycardia without clear source of infection, concerns for meningoencephalitis, there could also be concerns for other infections considering new transaminitis and new cervical lymphadenopathy  *Encephalopathy, with LP showing normal glucose, elevated protein and lymphocytic pleocytosis there is concern for viral meningoencephalitis  *Increasing cervical lymphadenopathy, unclear if reactive to CNS infection or some other infection  *Transaminitis worsening from initial presentation at [facility]    Recommendations: PLEASE DEFER ALL CHANGES IN PLAN UNTIL SIGNED BY ATTENDING. All recommendations are tentative pending Attending Attestation.  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, please continue IV hydration while on therapy  - follow CSF PCR  - I reached out to lab regarding HSV1/2 PCR order that was cancelled, pending their call back  - would check viral hepatitis panel considering acute change in AST/ALT  - trend temperature, AST/ALT, and WBC curve   - will adjust antimicrobial therapy based off of culture and sensitivity, blood and CSF cultures received by lab with results pending     Thom Wong DO, PGY-4   Infectious Disease Fellow  [employer] Teams Preferred  After 5pm/weekends call [phone]  Impression/Hospital Course:  48-year-old male PMH of HLD (currently not on medication) presenting with complaints of headache, vomiting, and unsteady gait. Patient states that he did not feel well since Monday, reported pressure in his sinuses He went to [facility] on Thursday where he was diagnosed with sinusitis and was prescribed augmentin. He took the medication that night and the next morning, without any relief and was experiencing fever and chills so he came to [facility]. He feels weak and reports feeling foggy, tired, and sleepy. Endorsed headache and photophobia.   Family also noted he was having unstable gait holding onto objects and wall to navigate which is off from his baseline. In the ED he started having change in mental status, became more disoriented with trouble concentrating and attention. He was also found to be ataxic, having difficulty coordinating movements along with febrile to Tmax of 100.9 and tachycardic. He underwent LP and which showed 64 protein, 67 glucose, 58 WBC with 49% lymphocytes and 43% monocytes with 11RBC. Imaging performed was a CTA Head and neck which showed cervical lymphadenopathy but not other acute findings and CXR which was negative. A CT head was performed at [facility] which was negative.     Antimicrobials:  vancomycin 4/6 - current  Ceftriaxone 4/6 - current  acyclovir 4/6 - current    Assessment:  *SIRS as evidenced by pyrexia and tachycardia without clear source of infection, concerns for meningoencephalitis, there could also be concerns for other infections considering new transaminitis and new cervical lymphadenopathy  *Encephalopathy, with LP showing normal glucose, elevated protein and lymphocytic pleocytosis there is concern for viral meningoencephalitis  *Increasing cervical lymphadenopathy, unclear if reactive to CNS infection or some other infection  *Transaminitis worsening from initial presentation at [facility]    Recommendations:   - discontinue Vancomycin and Ceftriaxone, as this is unlikely bacterial meningitis  - 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, please continue IV hydration while on therapy  - can d/c dexamethasone as this is unlikely strept pneumo meningitis   - follow CSF PCR  - I reached out to lab regarding HSV1/2 PCR order that was cancelled, pending their call back  - agree with MRI brain  - obtain serum cryptococcal antigen   - trend temperature, AST/ALT, and WBC curve   - will adjust antimicrobial therapy based off of culture and sensitivity, blood and CSF cultures received by lab with results pending     Thom Wong DO, PGY-4   Infectious Disease Fellow  [employer] Teams Preferred  After 5pm/weekends call [phone]  Impression/Hospital Course:  48-year-old male PMH of HLD (currently not on medication) presenting with complaints of headache, vomiting, and unsteady gait. Patient states that he did not feel well since Monday, reported pressure in his sinuses He went to [facility] on Thursday where he was diagnosed with sinusitis and was prescribed augmentin. He took the medication that night and the next morning, without any relief and was experiencing fever and chills so he came to [facility]. He feels weak and reports feeling foggy, tired, and sleepy. Endorsed headache and photophobia.   Family also noted he was having unstable gait holding onto objects and wall to navigate which is off from his baseline. In the ED he started having change in mental status, became more disoriented with trouble concentrating and attention. He was also found to be ataxic, having difficulty coordinating movements along with febrile to Tmax of 100.9 and tachycardic. He underwent LP and which showed 64 protein, 67 glucose, 58 WBC with 49% lymphocytes and 43% monocytes with 11RBC. Imaging performed was a CTA Head and neck which showed cervical lymphadenopathy but not other acute findings and CXR which was negative. A CT head was performed at [facility] which was negative.     Antimicrobials:  vancomycin 4/6 - current  Ceftriaxone 4/6 - current  acyclovir 4/6 - current    Assessment:  *SIRS as evidenced by pyrexia and tachycardia without clear source of infection, concerns for meningoencephalitis, there could also be concerns for other infections considering new transaminitis and new cervical lymphadenopathy  *Encephalopathy, with LP showing normal glucose, elevated protein and lymphocytic pleocytosis there is concern for viral meningoencephalitis  *Increasing cervical lymphadenopathy, unclear if reactive to CNS infection or some other infection  *Transaminitis worsening from initial presentation at [facility]    Recommendations:   - discontinue Vancomycin and Ceftriaxone, as this is unlikely bacterial meningitis  - 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, please continue IV hydration while on therapy  - can d/c dexamethasone as this is unlikely strept pneumo meningitis   - follow CSF PCR  - I reached out to lab regarding HSV1/2 PCR order that was cancelled, pending their call back  - agree with MRI brain  - obtain HIV screen  - obtain serum cryptococcal antigen   - trend temperature, AST/ALT, and WBC curve   - will adjust antimicrobial therapy based off of culture and sensitivity, blood and CSF cultures received by lab with results pending     Thom Wong DO, PGY-4   Infectious Disease Fellow  [employer] Teams Preferred  After 5pm/weekends call [phone]

## 2024-04-07 NOTE — PROGRESS NOTE ADULT - PROBLEM SELECTOR PLAN 1
- Patient presenting with fever, AMS, focal neuro symptoms of ataxia, behavioral change suggestive of encephalitis. No meningeal sign on exam    - CSF studies with glucose 67, protein 64, WBC 58  with lymphotic predominance suggestive of viral etiology, less likely bacterial although could be early bacterial etiology      Plan:   - Follow up bcx and infectious CSF studies including cultures, HSV PCR, lyme, [place]   - Serum encephalopathy work up ordered   - MRI brain w/wo IV contrast   - vEEG  - Seizure and fall precautions   - Empiric treatment with vanco (20 mg/kg q12h) , ceftriaxone (2g q12h) , and acyclovir (10mg/kg q8h) given concern for HSV encephalitis  - ID consult in the AM - Patient presenting with fever, AMS, focal neuro symptoms of ataxia, behavioral change suggestive of encephalitis. No meningeal sign on exam    - CSF studies with glucose 67, protein 64, WBC 58  with lymphotic predominance suggestive of viral etiology, less likely bacterial although could be early bacterial etiology      Plan:   - Follow up bcx and infectious CSF studies including cultures, HSV PCR, lyme, [place]   - Serum encephalopathy work up ordered   - MRI brain w/wo IV contrast   - vEEG  - Seizure and fall precautions   - Empiric treatment with vanco (20 mg/kg q12h) , ceftriaxone (2g q12h) , and acyclovir (10mg/kg q8h) given concern for HSV encephalitis  - Dexamethasone 10g q6 started  - ID consulted

## 2024-04-07 NOTE — CONSULT NOTE ADULT - SUBJECTIVE AND OBJECTIVE BOX
Patient is a 48y old  Male who presents with a chief complaint of fever (07 Apr 2024 07:14)    HPI:  48-year-old male PMH of HLD (currently not on medication) presenting with complaints of headache, vomiting, and unsteady gait. Patient states that he did not feel well since Monday, reported pressure in his sinuses He went to [facility] on Thursday where he was diagnosed with sinusitis and was prescribed augmentin. He took the medication that night and the next morning, without any relief and was experiencing fever and chills so he came to [facility]. He feels weak and reports feeling foggy, tired, and sleepy. Endorsed headache and photophobia.   Family also noted he was having unstable gait holding onto objects and wall to navigate which is off from his baseline. In the ED he started having change in mental status, became more disoriented with trouble concentrating and attention. He was also found to be ataxic, having difficulty coordinating movements along with febrile to Tmax of 100.9 and tachycardic. He underwent LP and which showed 64 protein, 67 glucose, 58 WBC with 49% lymphocytes and 43% monocytes with 11RBC. Imaging performed was a CTA Head and neck which showed cervical lymphadenopathy but not other acute findings and CXR which was negative. A CT head was performed at [facility] which was negative.        REVIEW OF SYSTEMS  pending full examination      prior hospital charts reviewed [V]  primary team notes reviewed [V]  other consultant notes reviewed [V]    PAST MEDICAL & SURGICAL HISTORY:  HLD (hyperlipidemia)      No significant past surgical history          SOCIAL HISTORY:  - Denied smoking/vaping/alcohol/recreational drug use    FAMILY HISTORY:  No pertinent family history in first degree relatives        Allergies  No Known Allergies        ANTIMICROBIALS:  acyclovir IVPB 700 every 8 hours  acyclovir IVPB    cefTRIAXone   IVPB 2000 every 12 hours  vancomycin  IVPB 1500 every 12 hours      ANTIMICROBIALS (past 90 days):  MEDICATIONS  (STANDING):    acyclovir IVPB   267.4 mL/Hr IV Intermittent (04-06-24 @ 21:10)    acyclovir IVPB   264 mL/Hr IV Intermittent (04-07-24 @ 07:58)    cefTRIAXone   IVPB   100 mL/Hr IV Intermittent (04-07-24 @ 05:05)    cefTRIAXone   IVPB   100 mL/Hr IV Intermittent (04-06-24 @ 20:20)    vancomycin  IVPB   100 mL/Hr IV Intermittent (04-07-24 @ 06:21)    vancomycin  IVPB   250 mL/Hr IV Intermittent (04-07-24 @ 03:45)        OTHER MEDS:   MEDICATIONS  (STANDING):  acetaminophen     Tablet .. 650 every 6 hours PRN  aluminum hydroxide/magnesium hydroxide/simethicone Suspension 30 every 4 hours PRN  ondansetron Injectable 4 every 8 hours PRN      VITALS:  Vital Signs Last 24 Hrs  T(F): 99 (04-07-24 @ 09:08), Max: 100.9 (04-06-24 @ 20:12)    Vital Signs Last 24 Hrs  HR: 85 (04-07-24 @ 09:08) (84 - 112)  BP: 115/78 (04-07-24 @ 09:08) (113/82 - 127/88)  RR: 18 (04-07-24 @ 09:08)  SpO2: 95% (04-07-24 @ 09:08) (95% - 106%)  Wt(kg): --    EXAM:  pending full examination      Labs:                        15.2   7.88  )-----------( 87       ( 06 Apr 2024 12:46 )             44.4     04-06    134<L>  |  97<L>  |  17  ----------------------------<  132<H>  3.7   |  23  |  0.99    Ca    8.8      06 Apr 2024 12:46    TPro  7.4  /  Alb  3.1<L>  /  TBili  0.7  /  DBili  x   /  AST  105<H>  /  ALT  75<H>  /  AlkPhos  187<H>  04-06      WBC Trend:  WBC Count: 7.88 (04-06-24 @ 12:46)  WBC Count: 9.75 (04-04-24 @ 17:02)      Auto Neutrophil #: 6.03 K/uL (04-06-24 @ 12:46)  Band Neutrophils %: 4.3 % (04-06-24 @ 12:46)  Auto Neutrophil #: 7.58 K/uL (04-04-24 @ 17:02)      Creatine Trend:  Creatinine: 0.99 (04-06)  Creatinine: 1.26 (04-04)      Liver Biochemical Testing Trend:  Alanine Aminotransferase (ALT/SGPT): 75 *H* (04-06)  Alanine Aminotransferase (ALT/SGPT): 37 (04-04)  Aspartate Aminotransferase (AST/SGOT): 105 (04-06-24 @ 12:46)  Aspartate Aminotransferase (AST/SGOT): 27 (04-04-24 @ 17:02)  Bilirubin Total: 0.7 (04-06)  Bilirubin Total: 1.2 (04-04)    Auto Eosinophil %: 0.0 % (04-06-24 @ 12:46)  Auto Eosinophil %: 0.0 % (04-04-24 @ 17:02)      Urinalysis Basic - ( 06 Apr 2024 12:46 )    Color: x / Appearance: x / SG: x / pH: x  Gluc: 132 mg/dL / Ketone: x  / Bili: x / Urobili: x   Blood: x / Protein: x / Nitrite: x   Leuk Esterase: x / RBC: x / WBC x   Sq Epi: x / Non Sq Epi: x / Bacteria: x    MICROBIOLOGY:  Culture - CSF with Gram Stain (collected 06 Apr 2024 21:32)  Source: .CSF CSF    Blood Gas Venous - Lactate: 1.6 (04-06 @ 19:35)    RADIOLOGY:  < from: CT Angio Head w/ IV Cont (04.06.24 @ 16:56) >  IMPRESSION:    CTA BRAIN: Patent intracranial circulation. No flow-limiting stenosis or   occlusion.    CTA NECK: Patent cervical vasculature. No flow limiting stenosis or   occlusion.    Increased number of nonspecific appearing subcentimeter short axis and   mildly enlarged cervical lymph nodes bilaterally may be reactive.   Correlate for clinical signs of infection.    < from: CT Head No Cont (04.04.24 @ 16:45) >  IMPRESSION:    No acute intracranial findings.      < from: Xray Chest 1 View-PORTABLE IMMEDIATE (04.06.24 @ 19:59) >  IMPRESSION:  Clear lungs.       Patient is a 48y old  Male who presents with a chief complaint of fever (07 Apr 2024 07:14)    HPI:  48-year-old male PMH of HLD (currently not on medication) presenting with complaints of headache, vomiting, and unsteady gait. Patient states that he did not feel well since Monday, reported pressure in his sinuses He went to [facility] on Thursday where he was diagnosed with sinusitis and was prescribed augmentin. He took the medication that night and the next morning, without any relief and was experiencing fever and chills so he came to [facility]. He feels weak and reports feeling foggy, tired, and sleepy. Endorsed headache and photophobia.   Family also noted he was having unstable gait holding onto objects and wall to navigate which is off from his baseline. In the ED he started having change in mental status, became more disoriented with trouble concentrating and attention. He was also found to be ataxic, having difficulty coordinating movements along with febrile to Tmax of 100.9 and tachycardic. He underwent LP and which showed 64 protein, 67 glucose, 58 WBC with 49% lymphocytes and 43% monocytes with 11RBC. Imaging performed was a CTA Head and neck which showed cervical lymphadenopathy but not other acute findings and CXR which was negative. A CT head was performed at [facility] which was negative. Currently patient states he still has some headache but is overall improved. He also states he has been able to walk without holding on to anything.     REVIEW OF SYSTEMS  Constitutional: No fevers, No chills, No weight loss, No fatigue   Skin: No rash, no phlebitis	  Eyes: No discharge, No change in vision	  ENMT: No sore throat, No ulcers  Respiratory: No cough, no SOB  Cardiovascular:  No chest pain, No palpitations   Gastrointestinal: No pain, No nausea, No vomiting, No diarrhea, No constipation	  Genitourinary: No dysuria, No frequency, No hesitancy, No flank pain  MSK: No Joint pain, No back pain, No edema  Neurological: positive HA, no weakness, no seizures, no AMS       prior hospital charts reviewed [V]  primary team notes reviewed [V]  other consultant notes reviewed [V]    PAST MEDICAL & SURGICAL HISTORY:  HLD (hyperlipidemia)      No significant past surgical history          SOCIAL HISTORY:  born in the [place], lives with his mom, works in oil delivery, has pet cats denies bites/scratches, last travel out of the country November 2016 to [place]     FAMILY HISTORY:  No pertinent family history in first degree relatives        Allergies  No Known Allergies        ANTIMICROBIALS:  acyclovir IVPB 700 every 8 hours  acyclovir IVPB    cefTRIAXone   IVPB 2000 every 12 hours  vancomycin  IVPB 1500 every 12 hours      ANTIMICROBIALS (past 90 days):  MEDICATIONS  (STANDING):    acyclovir IVPB   267.4 mL/Hr IV Intermittent (04-06-24 @ 21:10)    acyclovir IVPB   264 mL/Hr IV Intermittent (04-07-24 @ 07:58)    cefTRIAXone   IVPB   100 mL/Hr IV Intermittent (04-07-24 @ 05:05)    cefTRIAXone   IVPB   100 mL/Hr IV Intermittent (04-06-24 @ 20:20)    vancomycin  IVPB   100 mL/Hr IV Intermittent (04-07-24 @ 06:21)    vancomycin  IVPB   250 mL/Hr IV Intermittent (04-07-24 @ 03:45)        OTHER MEDS:   MEDICATIONS  (STANDING):  acetaminophen     Tablet .. 650 every 6 hours PRN  aluminum hydroxide/magnesium hydroxide/simethicone Suspension 30 every 4 hours PRN  ondansetron Injectable 4 every 8 hours PRN      VITALS:  Vital Signs Last 24 Hrs  T(F): 99 (04-07-24 @ 09:08), Max: 100.9 (04-06-24 @ 20:12)    Vital Signs Last 24 Hrs  HR: 85 (04-07-24 @ 09:08) (84 - 112)  BP: 115/78 (04-07-24 @ 09:08) (113/82 - 127/88)  RR: 18 (04-07-24 @ 09:08)  SpO2: 95% (04-07-24 @ 09:08) (95% - 106%)  Wt(kg): --    EXAM:  General: Patient appears comfortable, no acute distress  HEENT: NCAT, PERRL, anicteric sclera, mucous membranes moist and intact  Neck: Supple, No lymphadenopathy, no pain with active or passive ROM  CV: +S1/S2, RRR, no M/R/G  Lungs: No respiratory distress, CTA b/l, no wheezing, rales or rhonchi  Abd:  BS4+, Soft, NTND, no guarding  : No suprapubic tenderness  Neuro: AAOx3. No focal deficits noted.   Ext: No cyanosis, no edema  Msk: freely moving upper and lower extremities  Skin: No rash, no phlebitis, No erythema       Labs:                        15.2   7.88  )-----------( 87       ( 06 Apr 2024 12:46 )             44.4     04-06    134<L>  |  97<L>  |  17  ----------------------------<  132<H>  3.7   |  23  |  0.99    Ca    8.8      06 Apr 2024 12:46    TPro  7.4  /  Alb  3.1<L>  /  TBili  0.7  /  DBili  x   /  AST  105<H>  /  ALT  75<H>  /  AlkPhos  187<H>  04-06      WBC Trend:  WBC Count: 7.88 (04-06-24 @ 12:46)  WBC Count: 9.75 (04-04-24 @ 17:02)      Auto Neutrophil #: 6.03 K/uL (04-06-24 @ 12:46)  Band Neutrophils %: 4.3 % (04-06-24 @ 12:46)  Auto Neutrophil #: 7.58 K/uL (04-04-24 @ 17:02)      Creatine Trend:  Creatinine: 0.99 (04-06)  Creatinine: 1.26 (04-04)      Liver Biochemical Testing Trend:  Alanine Aminotransferase (ALT/SGPT): 75 *H* (04-06)  Alanine Aminotransferase (ALT/SGPT): 37 (04-04)  Aspartate Aminotransferase (AST/SGOT): 105 (04-06-24 @ 12:46)  Aspartate Aminotransferase (AST/SGOT): 27 (04-04-24 @ 17:02)  Bilirubin Total: 0.7 (04-06)  Bilirubin Total: 1.2 (04-04)    Auto Eosinophil %: 0.0 % (04-06-24 @ 12:46)  Auto Eosinophil %: 0.0 % (04-04-24 @ 17:02)      Urinalysis Basic - ( 06 Apr 2024 12:46 )    Color: x / Appearance: x / SG: x / pH: x  Gluc: 132 mg/dL / Ketone: x  / Bili: x / Urobili: x   Blood: x / Protein: x / Nitrite: x   Leuk Esterase: x / RBC: x / WBC x   Sq Epi: x / Non Sq Epi: x / Bacteria: x    MICROBIOLOGY:  Culture - CSF with Gram Stain (collected 06 Apr 2024 21:32)  Source: .CSF CSF    Blood Gas Venous - Lactate: 1.6 (04-06 @ 19:35)    RADIOLOGY:  < from: CT Angio Head w/ IV Cont (04.06.24 @ 16:56) >  IMPRESSION:    CTA BRAIN: Patent intracranial circulation. No flow-limiting stenosis or   occlusion.    CTA NECK: Patent cervical vasculature. No flow limiting stenosis or   occlusion.    Increased number of nonspecific appearing subcentimeter short axis and   mildly enlarged cervical lymph nodes bilaterally may be reactive.   Correlate for clinical signs of infection.    < from: CT Head No Cont (04.04.24 @ 16:45) >  IMPRESSION:    No acute intracranial findings.      < from: Xray Chest 1 View-PORTABLE IMMEDIATE (04.06.24 @ 19:59) >  IMPRESSION:  Clear lungs.

## 2024-04-07 NOTE — H&P ADULT - NSHPSOCIALHISTORY_GEN_ALL_CORE
Denied smoking or alcohol use. 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  rare EtOH use, last use > 1 week ago  Deneis tobacco or illicit substance use

## 2024-04-07 NOTE — H&P ADULT - ATTENDING COMMENTS
Patient seen and examined on 4/7/24, case discussed with Dr. Cris Newell. This is a 48M with history of HLD (not compliant with medications) who presents to the hospital with complaints of feeling unwell here found to have likely viral meningitis (CSF with elevated protein, elevated TNC with lymphocyte predominance, CSF gram stain negative), lower suspicion for bacterial meningitis as patient's CSF without significantly low glucose, no neutrophil predominance, and TNC/protein level not as high as expected for bacterial meningitis, early bacterial meningitis also low concern as patient's symptoms have been present for many days. Currently on exam patient is AAO x3 but easily distracted and requires some re-orientation to answer questions, neck supple, no TTP of the back, heart sounds nl without m/r/g, lungs CTAB, abdomen soft NTND with good bowel sounds, LE without edema, good peripheral pulses b/l. Patient's lab work, imaging, and EKG reviewed.     - Patient with likely encephalopathy in setting of sepsis 2/2 viral meningitis/encephalitis, will c/w abx and antivirals as above, ID eval  - Other management as above.

## 2024-04-07 NOTE — PROGRESS NOTE ADULT - ASSESSMENT
48-year-old male PMH of HLD (currently not on medication) presenting with fever, AMS, and focal neuro symptoms with behavioral change, overall concerning for sepsis in the setting of CNS infection, likely encephalitis vs meningitis.

## 2024-04-07 NOTE — H&P ADULT - PROBLEM SELECTOR PLAN 5
DVT ppx - SCDs  Diet - DASH regular  Activity - Ambulate with assistance    Fall, aspiration, and seizure ppx

## 2024-04-07 NOTE — H&P ADULT - ASSESSMENT
48-year-old male PMH of HLD (currently not on medication) prsenting with fever, AMS, and focal neuro symptoms with behavioral change, overall concerning for sepsis in the setting of CNS infection, likely encephalitis vs meningitis.  48-year-old male PMH of HLD (currently not on medication) prsenting with fever, AMS, and focal neuro symptoms with behavioral change, overall concerning for sepsis in the setting of CNS infection, likely encephalitis.

## 2024-04-07 NOTE — H&P ADULT - NSHPREVIEWOFSYSTEMS_GEN_ALL_CORE
Constitutional:  (+) fever, (+) chills, (+) lethargy  Eyes:  (-) eye pain (-) visual changes  ENMT: (-) nasal discharge, (-) sore throat. (-) neck pain or stiffness  Cardiac: (-) chest pain (-) palpitations  Respiratory:  (-) cough (-) respiratory distress  GI:  (-) nausea (-) vomiting (-) diarrhea (-) abdominal pain  :  (-) dysuria (-) frequency (-) burning  MS:  (-) back pain (-) joint pain  Neuro:  (+) headache (-) numbness (-) tingling (-) focal weakness  Skin:  (-) rash (-) petichiae   Except as documented in the HPI,  all other systems are negative

## 2024-04-07 NOTE — H&P ADULT - PROBLEM SELECTOR PLAN 4
- Patient reports history of HLD, was on medication but stopped taking it a while ago  - Would recommend outpatient follow up with his PCP for lipid profile and possible re-initiation of cholesterol meds as needed

## 2024-04-07 NOTE — ED ADULT NURSE REASSESSMENT NOTE - NS ED NURSE REASSESS COMMENT FT1
Received report from BERTRAM Echeverria. Pt is A&Ox3, resting in stretcher with no complaints at this time. Respirations even and unlabored, chest rise equal b/l. No acute distress noted. Report given to [facility] 1 BERTRAM Fontaine. Safety maintained throughout.

## 2024-04-07 NOTE — H&P ADULT - PROBLEM SELECTOR PLAN 3
- , , ALT 75 with R factor 1.2, suggestive of cholestatic pattern. Will obtain US abdomen to r/o gallbladder pathology

## 2024-04-07 NOTE — H&P ADULT - NSHPLABSRESULTS_GEN_ALL_CORE
Labs                            15.2   7.88  )-----------( 87       ( 06 Apr 2024 12:46 )             44.4         134<L>  |  97<L>  |  17  ----------------------------<  132<H>  3.7   |  23  |  0.99    Ca    8.8      06 Apr 2024 12:46    TPro  7.4  /  Alb  3.1<L>  /  TBili  0.7  /  DBili  x   /  AST  105<H>  /  ALT  75<H>  /  AlkPhos  187<H>  04-06              Urinalysis Basic - ( 06 Apr 2024 12:46 )    Color: x / Appearance: x / SG: x / pH: x  Gluc: 132 mg/dL / Ketone: x  / Bili: x / Urobili: x   Blood: x / Protein: x / Nitrite: x   Leuk Esterase: x / RBC: x / WBC x   Sq Epi: x / Non Sq Epi: x / Bacteria: x        PT/INR - ( 06 Apr 2024 19:35 )   PT: 12.8 sec;   INR: 1.14 ratio    PTT - ( 06 Apr 2024 19:35 )  PTT:34.4 sec  Lactate Trend      POCT Blood Glucose.: 129 mg/dL (06 Apr 2024 11:51) Labs                            15.2   7.88  )-----------( 87       ( 06 Apr 2024 12:46 )             44.4         134<L>  |  97<L>  |  17  ----------------------------<  132<H>  3.7   |  23  |  0.99    Ca    8.8      06 Apr 2024 12:46    TPro  7.4  /  Alb  3.1<L>  /  TBili  0.7  /  DBili  x   /  AST  105<H>  /  ALT  75<H>  /  AlkPhos  187<H>  04-06         Urinalysis Basic - ( 06 Apr 2024 12:46 )    Color: x / Appearance: x / SG: x / pH: x  Gluc: 132 mg/dL / Ketone: x  / Bili: x / Urobili: x   Blood: x / Protein: x / Nitrite: x   Leuk Esterase: x / RBC: x / WBC x   Sq Epi: x / Non Sq Epi: x / Bacteria: x        PT/INR - ( 06 Apr 2024 19:35 )   PT: 12.8 sec;   INR: 1.14 ratio    PTT - ( 06 Apr 2024 19:35 )  PTT:34.4 sec  Lactate Trend      POCT Blood Glucose.: 129 mg/dL (06 Apr 2024 11:51)      ACC: [id number] EXAM: CT ANGIO BRAIN (W)AW IC ORDERED BY: ALEKSEY TANNER  ACC: [id number] EXAM: CT ANGIO NECK (W)AW IC ORDERED BY: ALEKSEY TANNER  PROCEDURE DATE: 04/06/2024    IMPRESSION:    CTA BRAIN: Patent intracranial circulation. No flow-limiting stenosis or occlusion.  CTA NECK: Patent cervical vasculature. No flow limiting stenosis or occlusion.  Increased number of nonspecific appearing subcentimeter short axis and mildly enlarged cervical lymph nodes bilaterally may be reactive. Correlate for clinical signs of infection. Labs                            15.2   7.88  )-----------( 87       ( 06 Apr 2024 12:46 )             44.4         134<L>  |  97<L>  |  17  ----------------------------<  132<H>  3.7   |  23  |  0.99    Ca    8.8      06 Apr 2024 12:46    TPro  7.4  /  Alb  3.1<L>  /  TBili  0.7  /  DBili  x   /  AST  105<H>  /  ALT  75<H>  /  AlkPhos  187<H>  04-06         Urinalysis Basic - ( 06 Apr 2024 12:46 )    Color: x / Appearance: x / SG: x / pH: x  Gluc: 132 mg/dL / Ketone: x  / Bili: x / Urobili: x   Blood: x / Protein: x / Nitrite: x   Leuk Esterase: x / RBC: x / WBC x   Sq Epi: x / Non Sq Epi: x / Bacteria: x        PT/INR - ( 06 Apr 2024 19:35 )   PT: 12.8 sec;   INR: 1.14 ratio    PTT - ( 06 Apr 2024 19:35 )  PTT:34.4 sec  Lactate Trend      POCT Blood Glucose.: 129 mg/dL (06 Apr 2024 11:51)      CT ANGIO BRAIN AND NECK (04/06/2024)    CTA BRAIN: Patent intracranial circulation. No flow-limiting stenosis or occlusion.  CTA NECK: Patent cervical vasculature. No flow limiting stenosis or occlusion.  Increased number of nonspecific appearing subcentimeter short axis and mildly enlarged cervical lymph nodes bilaterally may be reactive. Correlate for clinical signs of infection. Labs                        15.2   7.88  )-----------( 87       ( 06 Apr 2024 12:46 )             44.4     134<L>  |  97<L>  |  17  ----------------------------<  132<H>  3.7   |  23  |  0.99    Ca    8.8      06 Apr 2024 12:46    TPro  7.4  /  Alb  3.1<L>  /  TBili  0.7  /  DBili  x   /  AST  105<H>  /  ALT  75<H>  /  AlkPhos  187<H>  04-06       Urinalysis (04.06.24 @ 10:09)   Blood, Urine: Negative  Glucose Qualitative, Urine: Negative mg/dL  pH Urine: 6.0  Color: Yellow  Urine Appearance: Clear  Bilirubin: Negative  Ketone - Urine: 15 mg/dL  Specific Gravity: 1.050  Protein, Urine: Trace mg/dL  Urobilinogen: 1.0 mg/dL  Nitrite: Negative  Leukocyte Esterase Concentration: Negative    PT/INR - ( 06 Apr 2024 19:35 )   PT: 12.8 sec;   INR: 1.14 ratio    PTT - ( 06 Apr 2024 19:35 )  PTT:34.4 sec  Lactate 1.6      CT ANGIO BRAIN AND NECK (04/06/2024)    CTA BRAIN: Patent intracranial circulation. No flow-limiting stenosis or occlusion.  CTA NECK: Patent cervical vasculature. No flow limiting stenosis or occlusion.  Increased number of nonspecific appearing subcentimeter short axis and mildly enlarged cervical lymph nodes bilaterally may be reactive. Correlate for clinical signs of infection.    < from: Xray Chest 1 View-PORTABLE IMMEDIATE (04.06.24 @ 19:59) >  FINDINGS:  The heart size is normal in size.  The lungs are clear. Low lung volumes.  There is no pleural effusion. There is no pneumothorax.  No acute bony abnormality.    IMPRESSION:  Clear lungs.  --- End of Report ---  < end of copied text >    EKG - Sinus tach at 102, QTc 450, TWI III, V2-V4 stable from prior, no other ST-T wave changes

## 2024-04-07 NOTE — CONSULT NOTE ADULT - ATTENDING COMMENTS
This is a 49 y/o M w/ PMHx of HLD who is presenting to [facility] on 4/6/24 for HA, vomiting, and unsteady gait. Pt states he has been feelign unwell since Monday, had pressure in his sinus. He went to  on Thursday, dx with sinusitis, recieved aumgnetin, however did not have any imrpovement. Started to have fevers, chills, unstable gait,     In the ER, pt was febrile to 100.9, other vitals stable. Labs w/o leukocytosis, transaminitis. U/A w/o signs of infection, COVID/Flu/RSV negative. CT angio brain/neck with mild cervical LAD.   Pt had LP in the ED, no opening pressure noted, Glucose 67, protein 64, nucleated cells 58, 49% lymphocytes, gram stain negative. Started on empiric Vancomycin, Ceftriaxone, Acyclovir.    #Encephalitis   #Fever   #VALDEZ    Overall, 49 y/o M w/ PMHx of HLD who is presenting w/ fevers, HA, gait difficulties, c/f encephalitis. LP with normal glucose, slightly elevated protein, with elevated nucleated cell count, lymphocytic. Pt forgetful, seems to have some change in personality on exam. Suspicious for HSV/VZV vs other aseptic encephalitis. Would continue with Acyclovir, can stop Vancomycin/Ceftriaxone.   F/u meningitis/encephalitis PCR.     Plan:   1. C/w Acyclovir 10 mg/kg q8, would c/w IVF while on Acyclovir   2. D/c Vancomycin/Ceftriaxone   3. Obtain MRI w/ and w/o contrast   4. Please obtain HSV/VZV PCR of CSF  5. Can send serum crypto Ag, however doubt cryptococcal meningitis   6. Send HIV test     Thank you for this consult. Inpatient ID team will follow.    Pradeep Venegas M.D.  Attending Physician  Division of Infectious Diseases  Department of Medicine    Please contact through MS Teams message.  Office: [phone] (after 5 PM or weekend)
I interviewed the patient, discussed the case with the Resident and agree with the findings and plan as documented in the Resident's note except below.  Mr. Pate is a 48-year-old right handed man with PMHx of HLD who was brought to [facility] ER due to the headache, vomiting, and unsteady gait. Etiology of above symptoms is due to the meningoencephalitis. Patient will benefit from further work up to rule out treatable etiologies.   Given transient episode of unresponsiveness patient will benefit from VEEG 24 hours.   LP  showed 64 protein, 67 glucose, 58 WBC with 49% lymphocytes and 43% monocytes with 11RBC.   MRI brain with and without contrast.   Regarding management of antibiotics and antiviral I will defer on ID.   Continue medical management, neuro- check and fall precaution.  GI and DVT prophylaxis.  I discussed the diagnosis and treatment plan with the patient. All questions and concerns were addressed. The patient demonstrated good understanding of the treatment plan.  My cumulative time taking care of this patient is 80 minutes  If you have any further questions, please do not hesitate to contact our consult service.  Thank you for allowing us to participate in this patient care.

## 2024-04-07 NOTE — H&P ADULT - HISTORY OF PRESENT ILLNESS
48-year-old male PMH of HLD (currently not on medication) presenting with complaints of headache, vomiting, and unsteady gait. Patient states that he did not feel well since Monday, reported pressure in his sinues. He went to [facility] on thursday where he was diagnosed with sinusitis and was prescribed augmentin. He took the medication that night and the next morning, without any relief and was experiencing fever and chills so he came to the ED today. Family also noted he was having unstable gait holding onto objects and wall to navigate which is off from his baseline.     He presented to [facility] ED for the above. Over the ED course, he started having change in mental status, became more disoriented with trouble concentrating and attention. He was also found to be ataxic, having difficulty coordinating movements. Febrile to  100.7. He underwent LP and was given vanco, ceftriaxone, and acyclovir for empiric treatment of meningitis.      Patient evaluated at bedside. He feels weak and reports feeling foggy, tired, and sleepy. Endorsed headache and photophobia.   48-year-old male PMH of HLD (currently not on medication) presenting with complaints of headache, vomiting, and unsteady gait. Patient states that he did not feel well since Monday, reported pressure in his sinuses He went to [facility] on Thursday where he was diagnosed with sinusitis and was prescribed augmentin. He took the medication that night and the next morning, without any relief and was experiencing fever and chills so he came to the ED today. Family also noted he was having unstable gait holding onto objects and wall to navigate which is off from his baseline.     He presented to [facility] ED for the above. Over the ED course, he started having change in mental status, became more disoriented with trouble concentrating and attention. He was also found to be ataxic, having difficulty coordinating movements. Febrile to 100.7. He underwent LP and was given vanco, ceftriaxone, and acyclovir for empiric treatment of meningitis.      Patient evaluated at bedside. He feels weak and reports feeling foggy, tired, and sleepy. Endorsed headache and photophobia.  Denied any sick contacts or recent travel.

## 2024-04-08 LAB
ALBUMIN SERPL ELPH-MCNC: 3.3 G/DL — SIGNIFICANT CHANGE UP (ref 3.3–5)
ALP SERPL-CCNC: 178 U/L — HIGH (ref 40–120)
ALT FLD-CCNC: 113 U/L — HIGH (ref 4–41)
ANION GAP SERPL CALC-SCNC: 13 MMOL/L — SIGNIFICANT CHANGE UP (ref 7–14)
AST SERPL-CCNC: 87 U/L — HIGH (ref 4–40)
BILIRUB SERPL-MCNC: 0.5 MG/DL — SIGNIFICANT CHANGE UP (ref 0.2–1.2)
BUN SERPL-MCNC: 16 MG/DL — SIGNIFICANT CHANGE UP (ref 7–23)
CALCIUM SERPL-MCNC: 9 MG/DL — SIGNIFICANT CHANGE UP (ref 8.4–10.5)
CHLORIDE SERPL-SCNC: 107 MMOL/L — SIGNIFICANT CHANGE UP (ref 98–107)
CO2 SERPL-SCNC: 25 MMOL/L — SIGNIFICANT CHANGE UP (ref 22–31)
CREAT SERPL-MCNC: 1.21 MG/DL — SIGNIFICANT CHANGE UP (ref 0.5–1.3)
CRYPTOC AG FLD QL: NEGATIVE — SIGNIFICANT CHANGE UP
EGFR: 74 ML/MIN/1.73M2 — SIGNIFICANT CHANGE UP
GLUCOSE SERPL-MCNC: 126 MG/DL — HIGH (ref 70–99)
HAV IGM SER-ACNC: SIGNIFICANT CHANGE UP
HBV CORE IGM SER-ACNC: SIGNIFICANT CHANGE UP
HBV SURFACE AG SER-ACNC: SIGNIFICANT CHANGE UP
HCT VFR BLD CALC: 46.7 % — SIGNIFICANT CHANGE UP (ref 39–50)
HCV AB S/CO SERPL IA: 0.27 S/CO — SIGNIFICANT CHANGE UP (ref 0–0.99)
HCV AB SERPL-IMP: SIGNIFICANT CHANGE UP
HGB BLD-MCNC: 15.4 G/DL — SIGNIFICANT CHANGE UP (ref 13–17)
HIV 1+2 AB+HIV1 P24 AG SERPL QL IA: SIGNIFICANT CHANGE UP
LABORATORY COMMENT REPORT: SIGNIFICANT CHANGE UP
MAGNESIUM SERPL-MCNC: 2.5 MG/DL — SIGNIFICANT CHANGE UP (ref 1.6–2.6)
MCHC RBC-ENTMCNC: 27.3 PG — SIGNIFICANT CHANGE UP (ref 27–34)
MCHC RBC-ENTMCNC: 33 GM/DL — SIGNIFICANT CHANGE UP (ref 32–36)
MCV RBC AUTO: 82.8 FL — SIGNIFICANT CHANGE UP (ref 80–100)
NRBC # BLD: 0 /100 WBCS — SIGNIFICANT CHANGE UP (ref 0–0)
NRBC # FLD: 0 K/UL — SIGNIFICANT CHANGE UP (ref 0–0)
PHOSPHATE SERPL-MCNC: 2.9 MG/DL — SIGNIFICANT CHANGE UP (ref 2.5–4.5)
PLATELET # BLD AUTO: 168 K/UL — SIGNIFICANT CHANGE UP (ref 150–400)
POTASSIUM SERPL-MCNC: 3.3 MMOL/L — LOW (ref 3.5–5.3)
POTASSIUM SERPL-SCNC: 3.3 MMOL/L — LOW (ref 3.5–5.3)
PROT SERPL-MCNC: 6.8 G/DL — SIGNIFICANT CHANGE UP (ref 6–8.3)
RBC # BLD: 5.64 M/UL — SIGNIFICANT CHANGE UP (ref 4.2–5.8)
RBC # FLD: 14.9 % — HIGH (ref 10.3–14.5)
SODIUM SERPL-SCNC: 145 MMOL/L — SIGNIFICANT CHANGE UP (ref 135–145)
SOURCE HSV 1/2: SIGNIFICANT CHANGE UP
WBC # BLD: 8.8 K/UL — SIGNIFICANT CHANGE UP (ref 3.8–10.5)
WBC # FLD AUTO: 8.8 K/UL — SIGNIFICANT CHANGE UP (ref 3.8–10.5)

## 2024-04-08 PROCEDURE — 95720 EEG PHY/QHP EA INCR W/VEEG: CPT

## 2024-04-08 PROCEDURE — 99232 SBSQ HOSP IP/OBS MODERATE 35: CPT | Mod: GC

## 2024-04-08 PROCEDURE — 99233 SBSQ HOSP IP/OBS HIGH 50: CPT

## 2024-04-08 PROCEDURE — 99232 SBSQ HOSP IP/OBS MODERATE 35: CPT

## 2024-04-08 RX ORDER — POTASSIUM CHLORIDE 20 MEQ
40 PACKET (EA) ORAL EVERY 4 HOURS
Refills: 0 | Status: COMPLETED | OUTPATIENT
Start: 2024-04-08 | End: 2024-04-08

## 2024-04-08 RX ORDER — SODIUM CHLORIDE 9 MG/ML
1000 INJECTION INTRAMUSCULAR; INTRAVENOUS; SUBCUTANEOUS
Refills: 0 | Status: DISCONTINUED | OUTPATIENT
Start: 2024-04-08 | End: 2024-04-08

## 2024-04-08 RX ADMIN — Medication 650 MILLIGRAM(S): at 22:53

## 2024-04-08 RX ADMIN — Medication 650 MILLIGRAM(S): at 22:23

## 2024-04-08 RX ADMIN — Medication 40 MILLIEQUIVALENT(S): at 14:15

## 2024-04-08 RX ADMIN — Medication 264 MILLIGRAM(S): at 21:20

## 2024-04-08 RX ADMIN — Medication 264 MILLIGRAM(S): at 05:34

## 2024-04-08 RX ADMIN — Medication 264 MILLIGRAM(S): at 13:31

## 2024-04-08 RX ADMIN — Medication 40 MILLIEQUIVALENT(S): at 17:24

## 2024-04-08 RX ADMIN — SODIUM CHLORIDE 100 MILLILITER(S): 9 INJECTION INTRAMUSCULAR; INTRAVENOUS; SUBCUTANEOUS at 13:31

## 2024-04-08 RX ADMIN — Medication 40 MILLIEQUIVALENT(S): at 10:33

## 2024-04-08 NOTE — CHART NOTE - NSCHARTNOTEFT_GEN_A_CORE
EEG preliminary read (not final) on the initial recording hour(s) = x 2    No seizures recorded.  Background symmetric and continuous with an anterior posterior gradient. 10 Hz PDR.   Final report to follow tomorrow morning after completion of study.    [facility] EEG Reading Room Ph#: [phone]  Epilepsy Answering Service after 5PM and before 8:30AM: Ph#: [phone]

## 2024-04-08 NOTE — PROGRESS NOTE ADULT - SUBJECTIVE AND OBJECTIVE BOX
Infectious Diseases Follow Up:    Patient is a 48y old  Male who presents with a chief complaint of fever (08 Apr 2024 06:47)      Interval History/ROS:    Allergies  No Known Allergies        ANTIMICROBIALS:  acyclovir IVPB 700 every 8 hours  acyclovir IVPB        Current Abx:     Previous Abx     OTHER MEDS:  MEDICATIONS  (STANDING):  acetaminophen     Tablet .. 650 every 6 hours PRN  aluminum hydroxide/magnesium hydroxide/simethicone Suspension 30 every 4 hours PRN  ondansetron Injectable 4 every 8 hours PRN      Vital Signs Last 24 Hrs  T(C): 36.8 (08 Apr 2024 05:30), Max: 37 (07 Apr 2024 16:00)  T(F): 98.2 (08 Apr 2024 05:30), Max: 98.6 (07 Apr 2024 16:00)  HR: 87 (08 Apr 2024 05:30) (80 - 87)  BP: 127/90 (08 Apr 2024 05:30) (104/78 - 127/90)  BP(mean): --  RR: 18 (08 Apr 2024 05:30) (18 - 18)  SpO2: 100% (08 Apr 2024 05:30) (96% - 100%)    Parameters below as of 08 Apr 2024 05:30  Patient On (Oxygen Delivery Method): room air        PHYSICAL EXAM:  GENERAL: NAD, well-developed  HEAD:  Atraumatic, Normocephalic  EYES: EOMI, PERRLA, conjunctiva and sclera clear  NECK: Supple, No JVD  CHEST/LUNG: Clear to auscultation bilaterally; No wheeze  HEART: Regular rate and rhythm; No murmurs, rubs, or gallops  ABDOMEN: Soft, Nontender, Nondistended; Bowel sounds present  EXTREMITIES:  2+ Peripheral Pulses, No clubbing, cyanosis, or edema  PSYCH: AAOx3  NEUROLOGY: non-focal  SKIN: No rashes or lesions                          15.4   8.80  )-----------( 168      ( 08 Apr 2024 06:52 )             46.7       04-08    145  |  107  |  16  ----------------------------<  126<H>  3.3<L>   |  25  |  1.21    Ca    9.0      08 Apr 2024 06:52  Phos  2.9     04-08  Mg     2.50     04-08    TPro  6.8  /  Alb  3.3  /  TBili  0.5  /  DBili  x   /  AST  87<H>  /  ALT  113<H>  /  AlkPhos  178<H>  04-08      Urinalysis Basic - ( 08 Apr 2024 06:52 )    Color: x / Appearance: x / SG: x / pH: x  Gluc: 126 mg/dL / Ketone: x  / Bili: x / Urobili: x   Blood: x / Protein: x / Nitrite: x   Leuk Esterase: x / RBC: x / WBC x   Sq Epi: x / Non Sq Epi: x / Bacteria: x        MICROBIOLOGY:  v  .CSF CSF  04-06-24   No growth to date.  --    polymorphonuclear leukocytes seen  No organisms seen  by cytocentrifuge      .Blood Blood-Peripheral  04-06-24   No growth at 24 hours  --  --      .Blood Blood-Peripheral  04-06-24   No growth at 24 hours  --  --                RADIOLOGY:

## 2024-04-08 NOTE — PROGRESS NOTE ADULT - ASSESSMENT
This is a 49 y/o M w/ PMHx of HLD who is presenting to [facility] on 4/6/24 for HA, vomiting, and unsteady gait. Pt states he has been feelign unwell since Monday, had pressure in his sinus. He went to  on Thursday, dx with sinusitis, recieved aumgnetin, however did not have any imrpovement. Started to have fevers, chills, unstable gait,     In the ER, pt was febrile to 100.9, other vitals stable. Labs w/o leukocytosis, transaminitis. U/A w/o signs of infection, COVID/Flu/RSV negative. CT angio brain/neck with mild cervical LAD.   Pt had LP in the ED, no opening pressure noted, Glucose 67, protein 64, nucleated cells 58, 49% lymphocytes, gram stain negative. Started on empiric Vancomycin, Ceftriaxone, Acyclovir.    #Encephalitis   #Fever   #VALDEZ    Overall, 49 y/o M w/ PMHx of HLD who is presenting w/ fevers, HA, gait difficulties, c/f encephalitis. LP with normal glucose, slightly elevated protein, with elevated nucleated cell count, lymphocytic. Pt forgetful, seems to have some change in personality on exam. Suspicious for HSV/VZV vs other aseptic encephalitis. Would continue with Acyclovir, can stop Vancomycin/Ceftriaxone.   F/u meningitis/encephalitis PCR.     CSF meningitis/encephalitis panel negative, however will request HSV1/2 PCR. Will send for syphilis testing (neurosyphillis?), cryptococcus serum Ag. Unknown etiology at this time.     Plan:   1. C/w Acyclovir 10 mg/kg q8, would c/w IVF while on Acyclovir   2. F/u HSV 1/2 PCR, crypto serum Ag, will order syphilis testing  3. Would have neurology follow up.     Thank you for this consult. Inpatient ID team will follow.    Pradeep Venegas M.D.  Attending Physician  Division of Infectious Diseases  Department of Medicine

## 2024-04-08 NOTE — PROGRESS NOTE ADULT - PROBLEM SELECTOR PLAN 1
- Patient presenting with fever, AMS, focal neuro symptoms of ataxia, behavioral change suggestive of encephalitis. No meningeal sign on exam    - CSF studies with glucose 67, protein 64, WBC 58  with lymphotic predominance suggestive of viral etiology, less likely bacterial although could be early bacterial etiology      Plan:   - Follow up bcx and infectious CSF studies including cultures, HSV PCR, lyme, [place]   - Serum encephalopathy work up ordered   - MRI brain w/wo IV contrast   - vEEG  - Seizure and fall precautions   - Empiric treatment with vanco (20 mg/kg q12h) , ceftriaxone (2g q12h) , and acyclovir (10mg/kg q8h) given concern for HSV encephalitis  - Dexamethasone 10g q6 started  - ID consulted - Patient presenting with fever, AMS, focal neuro symptoms of ataxia, behavioral change suggestive of encephalitis. No meningeal sign on exam    - CSF studies with glucose 67, protein 64, WBC 58  with lymphotic predominance suggestive of viral etiology, less likely bacterial although could be early bacterial etiology      Plan:   - Follow up bcx and infectious CSF studies including cultures, HSV PCR, lyme, [place]   - Serum encephalopathy work up ordered   - MRI brain w/wo IV contrast   - vEEG  - Seizure and fall precautions   - C/w acyclovir per ID recommendations  - DDx viral meninigitis (HSV/VZV vs other aseptic encephalitis) vs autoimmune disease (AIE) vs  toxic metabolic disturbances per neurology

## 2024-04-08 NOTE — PROGRESS NOTE ADULT - ASSESSMENT
48M pmhx HLD (on statin), presents with multiple neurologic issues, including HA, double vision, gait instability, and (most recently) altered mental status. Pt first started endorsing HA on 4/1- felt like b/l pressure in sinus + behind eyes, a/w nausea, no photo/phonophobia, nonpositional. Throughout the week he has been barely eating. On 4/3 with syncopal episode and woke up within 10-15 seconds. Pt has also been having gait unsteadiness (using items/walls to help navigate, normally has no issues with ambulation at baseline) and "dizziness" ( pt unable to specify if room spinning). Pt initially presented to [place] on Thursday 4/4, where he was diagnosed with a sinus infection and given Augmentin, which he took at home. However, pt's symptoms did not improve - at some point after, he had a transient episode of vertical diplopia that lasted one hour (unable to tell me if the diplopia went away with one eye closed).  Pt also with fever and chills (in [facility] ED, TMax 100.7). LP  showed 64 protein, 67 glucose, 58 WBC with 49% lymphocytes and 43% monocytes with 11RBC. Was on antibacterial but this was stopped and now remains on acyclovir and fluids. He denied any drug use. Today patient stated that his "brain fog" has improved but is not completely back to baseline. HA, dizziness and vision changes have resolved.  Able to ambulate without difficulty.     Impression:  Multiple neurologic complaints, including HA, transient double vision, gait instability, and change in mental status (most of which have resolved). Abnormal CSF findings  64 protein, 67 glucose, 58 WBC with 49% lymphocytes and 43% monocytes. Fevers. Elevated liver enzymes (, , ALT 75), PLT 87, and b/l cervical lymphadenopathy.  MRI showing non specific severe white matter changes bilaterally.  Etiology unclear at this juncture. DDx viral meninigitis (HSV/VZV vs other aseptic encephalitis) vs autoimmune disease (AIE) vs  toxic metabolic disturbances     Recommendations:   [] C/w acyclovir as per ID   [x ] S/p LP64 protein, 67 glucose, 58 WBC with 49% lymphocytes and 43% monocytes. Neg CSF PCR, HIV, gram stain. Pending Lyme, WNV, ACE, CSF IgG Index, cryptococcal, paraneoplastic   Please add CSF and serum autoimmune panel. Add CSF OCB, NMO, MOG, MBP, flow cytometry   [x] MRI Brain w/wo IV contrast- reviewed as above  [ ] Pending  vEEG  [] Trending LFTs   [] ID following, note appreciated      Rest of workup/management per primary team    Case seen and discussed with Dr. Bain   Note incomplete until finalized by attending signature/attestation.      48M pmhx HLD (on statin), presents with multiple neurologic issues, including HA, double vision, gait instability, and (most recently) altered mental status. Pt first started endorsing HA on 4/1- felt like b/l pressure in sinus + behind eyes, a/w nausea, no photo/phonophobia, nonpositional. Throughout the week he has been barely eating. On 4/3 with syncopal episode and woke up within 10-15 seconds. Pt has also been having gait unsteadiness (using items/walls to help navigate, normally has no issues with ambulation at baseline) and "dizziness" ( pt unable to specify if room spinning). Pt initially presented to [place] on Thursday 4/4, where he was diagnosed with a sinus infection and given Augmentin, which he took at home. However, pt's symptoms did not improve - at some point after, he had a transient episode of vertical diplopia that lasted one hour (unable to tell me if the diplopia went away with one eye closed).  Pt also with fever and chills (in [facility] ED, TMax 100.7). LP  showed 64 protein, 67 glucose, 58 WBC with 49% lymphocytes and 43% monocytes with 11RBC. Was on antibacterial but this was stopped and now remains on acyclovir and fluids. He denied any drug use. Today patient stated that his "brain fog" has improved but is not completely back to baseline. HA, dizziness and vision changes have resolved.  Able to ambulate without difficulty.     Impression:  Multiple neurologic complaints, including HA, transient double vision, gait instability, and change in mental status (most of which have resolved). Abnormal CSF findings  64 protein, 67 glucose, 58 WBC with 49% lymphocytes and 43% monocytes. Fevers. Elevated liver enzymes (, , ALT 75), PLT 87, and b/l cervical lymphadenopathy.  MRI showing non specific severe white matter changes bilaterally.  Etiology unclear at this juncture. DDx viral meninigitis (HSV/VZV vs other aseptic encephalitis) vs autoimmune disease (AIE) vs  toxic metabolic disturbances     Recommendations:   [] C/w acyclovir as per ID   [x ] S/p LP64 protein, 67 glucose, 58 WBC with 49% lymphocytes and 43% monocytes. Neg CSF PCR, HIV, gram stain. Pending Lyme, WNV, ACE, CSF IgG Index, cryptococcal, paraneoplastic   Please add CSF and serum autoimmune panel. Add CSF OCB, ELOISA virus, MBP, flow cytometry   NMO, MOG SERUM  [x] MRI Brain w/wo IV contrast- reviewed as above  [ ] Pending  vEEG  [] Trending LFTs   [] ID following, note appreciated      Rest of workup/management per primary team    Case seen and discussed with Dr. Bain   Note incomplete until finalized by attending signature/attestation.      48M pmhx HLD (on statin), presents with multiple neurologic issues, including HA, double vision, gait instability, and (most recently) altered mental status. Pt first started endorsing HA on 4/1- felt like b/l pressure in sinus + behind eyes, a/w nausea, no photo/phonophobia, nonpositional. Throughout the week he has been barely eating. On 4/3 with syncopal episode and woke up within 10-15 seconds. Pt has also been having gait unsteadiness (using items/walls to help navigate, normally has no issues with ambulation at baseline) and "dizziness" ( pt unable to specify if room spinning). Pt initially presented to [place] on Thursday 4/4, where he was diagnosed with a sinus infection and given Augmentin, which he took at home. However, pt's symptoms did not improve - at some point after, he had a transient episode of vertical diplopia that lasted one hour (unable to tell me if the diplopia went away with one eye closed).  Pt also with fever and chills (in [facility] ED, TMax 100.7). LP  showed 64 protein, 67 glucose, 58 WBC with 49% lymphocytes and 43% monocytes with 11RBC. Was on antibacterial but this was stopped and now remains on acyclovir and fluids. He denied any drug use. Today patient stated that his "brain fog" has improved but is not completely back to baseline. HA, dizziness and vision changes have resolved.  Able to ambulate without difficulty.     Impression:  Multiple neurologic complaints, including HA, transient double vision, gait instability, and change in mental status (most of which have resolved). Abnormal CSF findings  64 protein, 67 glucose, 58 WBC with 49% lymphocytes and 43% monocytes. Fevers. Elevated liver enzymes (, , ALT 75), PLT 87, and b/l cervical lymphadenopathy.  MRI showing non specific severe white matter changes bilaterally.  Etiology unclear at this juncture. DDx viral meninigitis (HSV/VZV vs other aseptic encephalitis) vs autoimmune disease (AIE) vs  toxic metabolic disturbances     Recommendations:   [] C/w acyclovir as per ID   [x ] S/p LP64 protein, 67 glucose, 58 WBC with 49% lymphocytes and 43% monocytes. Neg CSF PCR, HIV, gram stain. Pending Lyme, WNV, ACE, CSF IgG Index, cryptococcal, paraneoplastic   Please add CSF and serum autoimmune panel. Add CSF OCB, ELOISA virus, MBP, flow cytometry   NMO, MOG SERUM  [x] MRI Brain w/wo IV contrast- reviewed as above  [ ] Pending  vEEG  [] Trending LFTs   [] ID following, note appreciated      Rest of workup/management per primary team    [facility] with [facility], located in [address] [place] [address]. Patient/family can call [phone] to schedule this appointment.     Case seen and discussed with Dr. Bain   Note incomplete until finalized by attending signature/attestation.

## 2024-04-08 NOTE — PROGRESS NOTE ADULT - SUBJECTIVE AND OBJECTIVE BOX
Infectious Diseases Follow Up:    Patient is a 48y old  Male who presents with a chief complaint of fever (08 Apr 2024 12:20)      Interval History/ROS:  Afebrile ON, HA, brain fog improved     Allergies  No Known Allergies        ANTIMICROBIALS:  acyclovir IVPB 700 every 8 hours  acyclovir IVPB        Current Abx:     Previous Abx     OTHER MEDS:  MEDICATIONS  (STANDING):  acetaminophen     Tablet .. 650 every 6 hours PRN  aluminum hydroxide/magnesium hydroxide/simethicone Suspension 30 every 4 hours PRN  ondansetron Injectable 4 every 8 hours PRN      Vital Signs Last 24 Hrs  T(C): 37 (08 Apr 2024 12:37), Max: 37 (07 Apr 2024 16:00)  T(F): 98.6 (08 Apr 2024 12:37), Max: 98.6 (07 Apr 2024 16:00)  HR: 83 (08 Apr 2024 12:37) (80 - 87)  BP: 121/60 (08 Apr 2024 12:37) (104/78 - 127/90)  BP(mean): --  RR: 18 (08 Apr 2024 12:37) (18 - 18)  SpO2: 100% (08 Apr 2024 12:37) (96% - 100%)    Parameters below as of 08 Apr 2024 05:30  Patient On (Oxygen Delivery Method): room air        PHYSICAL EXAM:  GENERAL: NAD, well-developed  HEAD:  Atraumatic, Normocephalic  EYES: EOMI, conjunctiva and sclera clear  NECK: Supple, No JVD  CHEST/LUNG: Clear to auscultation bilaterally; No wheeze  HEART: Regular rate and rhythm; No murmurs, rubs, or gallops  ABDOMEN: Soft, Nontender, Nondistended; Bowel sounds present  PSYCH: AAOx3                          15.4   8.80  )-----------( 168      ( 08 Apr 2024 06:52 )             46.7       04-08    145  |  107  |  16  ----------------------------<  126<H>  3.3<L>   |  25  |  1.21    Ca    9.0      08 Apr 2024 06:52  Phos  2.9     04-08  Mg     2.50     04-08    TPro  6.8  /  Alb  3.3  /  TBili  0.5  /  DBili  x   /  AST  87<H>  /  ALT  113<H>  /  AlkPhos  178<H>  04-08      Urinalysis Basic - ( 08 Apr 2024 06:52 )    Color: x / Appearance: x / SG: x / pH: x  Gluc: 126 mg/dL / Ketone: x  / Bili: x / Urobili: x   Blood: x / Protein: x / Nitrite: x   Leuk Esterase: x / RBC: x / WBC x   Sq Epi: x / Non Sq Epi: x / Bacteria: x        MICROBIOLOGY:  v  .CSF CSF  04-06-24   No growth to date.  --    polymorphonuclear leukocytes seen  No organisms seen  by cytocentrifuge      .Blood Blood-Peripheral  04-06-24   No growth at 24 hours  --  --      .Blood Blood-Peripheral  04-06-24   No growth at 24 hours  --  --                RADIOLOGY:  < from: MR Head w/wo IV Cont (04.07.24 @ 13:06) >  No abnormal leptomeningeal or parenchymal enhancement. Partially empty   sella.  There is no abnormal restricted diffusion to suggest acute infarction.   Scattered periventricular and subcortical white matter T2 /FLAIR   hyperintensities are seen without mass effect, nonspecific, likely   representing severe chronic white matter changes. Differential diagnosis   includes infection, inflammation, autoimmune, vascular,   leukoencephalopathies, dysmyelinating disorders, and demyelinating   disease etiologies.  Normal T2 flow-voids are seen within  the   intracranial vasculature. The lateral ventricles and cortical sulci are   age-appropriate in size and configuration. There is no mass, mass effect,   or extra-axial fluid collection. There is no susceptibility artifact to   suggest hemorrhage. Midline structures are normal.  The visualized   paranasal sinuses, mastoid air cells and orbits are unremarkable.      IMPRESSION: No acute infarction. No abnormal enhancement. Severe chronic   white matter changes. Differential diagnosis above.

## 2024-04-08 NOTE — PROGRESS NOTE ADULT - SUBJECTIVE AND OBJECTIVE BOX
*******************************  Edward Sims, PGY-1  Internal Medicine  Contact via [employer] TEAMS    *******************************    PROGRESS NOTE:     Patient is a 48y old  Male who presents with a chief complaint of fever (07 Apr 2024 09:16)      INTERVAL EVENTS: No acute overnight events.     SUBJECTIVE: Patient seen and examined at bedside. This morning, the patient is comfortable and doing well. No acute complaints. Denies fevers, chills, N/V/D, chest pain, SOB, abdominal pain.    MEDICATIONS  (STANDING):  acyclovir IVPB      acyclovir IVPB 700 milliGRAM(s) IV Intermittent every 8 hours  sodium chloride 0.9%. 1000 milliLiter(s) (100 mL/Hr) IV Continuous <Continuous>    MEDICATIONS  (PRN):  acetaminophen     Tablet .. 650 milliGRAM(s) Oral every 6 hours PRN Temp greater or equal to 38C (100.4F), Mild Pain (1 - 3)  aluminum hydroxide/magnesium hydroxide/simethicone Suspension 30 milliLiter(s) Oral every 4 hours PRN Dyspepsia  ondansetron Injectable 4 milliGRAM(s) IV Push every 8 hours PRN Nausea and/or Vomiting      CAPILLARY BLOOD GLUCOSE        I&O's Summary      PHYSICAL EXAM:  Vital Signs Last 24 Hrs  T(C): 36.8 (08 Apr 2024 05:30), Max: 37.2 (07 Apr 2024 09:08)  T(F): 98.2 (08 Apr 2024 05:30), Max: 99 (07 Apr 2024 09:08)  HR: 87 (08 Apr 2024 05:30) (78 - 88)  BP: 127/90 (08 Apr 2024 05:30) (104/78 - 127/90)  BP(mean): --  RR: 18 (08 Apr 2024 05:30) (18 - 18)  SpO2: 100% (08 Apr 2024 05:30) (95% - 100%)    Parameters below as of 08 Apr 2024 05:30  Patient On (Oxygen Delivery Method): room air        GENERAL: NAD, lying in bed comfortably  HEAD: Atraumatic, normocephalic  EYES: EOMI, PERRLA, conjunctiva and sclera clear  ENT: Moist mucous membranes  NECK: Supple, no JVD  HEART: S1, S2, Regular rate and rhythm, no murmurs, rubs, or gallops  LUNGS: Unlabored respirations, clear to auscultation bilaterally, no crackles, wheezing, or rhonchi  ABDOMEN: Soft, nontender, nondistended, +BS  EXTREMITIES: 2+ peripheral pulses bilaterally. No clubbing, cyanosis, or edema  NERVOUS SYSTEM:  A&Ox3, no focal deficits   SKIN: No rashes or lesions    LABS:                        15.2   7.88  )-----------( 87       ( 06 Apr 2024 12:46 )             44.4     04-06    134<L>  |  97<L>  |  17  ----------------------------<  132<H>  3.7   |  23  |  0.99    Ca    8.8      06 Apr 2024 12:46    TPro  7.4  /  Alb  3.1<L>  /  TBili  0.7  /  DBili  x   /  AST  105<H>  /  ALT  75<H>  /  AlkPhos  187<H>  04-06    PT/INR - ( 06 Apr 2024 19:35 )   PT: 12.8 sec;   INR: 1.14 ratio         PTT - ( 06 Apr 2024 19:35 )  PTT:34.4 sec      Urinalysis Basic - ( 06 Apr 2024 12:46 )    Color: x / Appearance: x / SG: x / pH: x  Gluc: 132 mg/dL / Ketone: x  / Bili: x / Urobili: x   Blood: x / Protein: x / Nitrite: x   Leuk Esterase: x / RBC: x / WBC x   Sq Epi: x / Non Sq Epi: x / Bacteria: x        Culture - CSF with Gram Stain (collected 06 Apr 2024 21:32)  Source: .CSF CSF  Gram Stain (07 Apr 2024 00:47):    polymorphonuclear leukocytes seen    No organisms seen    by cytocentrifuge  Preliminary Report (07 Apr 2024 15:38):    No growth to date.    Culture - Blood (collected 06 Apr 2024 19:30)  Source: .Blood Blood-Peripheral  Preliminary Report (08 Apr 2024 02:03):    No growth at 24 hours    Culture - Blood (collected 06 Apr 2024 19:15)  Source: .Blood Blood-Peripheral  Preliminary Report (08 Apr 2024 02:03):    No growth at 24 hours        RADIOLOGY & ADDITIONAL TESTS:  Results Reviewed:   Imaging Personally Reviewed:  Electrocardiogram Personally Reviewed:  Tele: *******************************  Edward Sims, PGY-1  Internal Medicine  Contact via [employer] TEAMS    *******************************    PROGRESS NOTE:     Patient is a 48y old  Male who presents with a chief complaint of fever (07 Apr 2024 09:16)      INTERVAL EVENTS: No acute overnight events.     SUBJECTIVE: Patient seen and examined at bedside. This morning, the patient is comfortable and doing well. No acute complaints. Denies fevers, chills, N/V/D, chest pain, SOB, abdominal pain. Notes that his brain fog is improving.    MEDICATIONS  (STANDING):  acyclovir IVPB      acyclovir IVPB 700 milliGRAM(s) IV Intermittent every 8 hours  sodium chloride 0.9%. 1000 milliLiter(s) (100 mL/Hr) IV Continuous <Continuous>    MEDICATIONS  (PRN):  acetaminophen     Tablet .. 650 milliGRAM(s) Oral every 6 hours PRN Temp greater or equal to 38C (100.4F), Mild Pain (1 - 3)  aluminum hydroxide/magnesium hydroxide/simethicone Suspension 30 milliLiter(s) Oral every 4 hours PRN Dyspepsia  ondansetron Injectable 4 milliGRAM(s) IV Push every 8 hours PRN Nausea and/or Vomiting      CAPILLARY BLOOD GLUCOSE        I&O's Summary      PHYSICAL EXAM:  Vital Signs Last 24 Hrs  T(C): 36.8 (08 Apr 2024 05:30), Max: 37.2 (07 Apr 2024 09:08)  T(F): 98.2 (08 Apr 2024 05:30), Max: 99 (07 Apr 2024 09:08)  HR: 87 (08 Apr 2024 05:30) (78 - 88)  BP: 127/90 (08 Apr 2024 05:30) (104/78 - 127/90)  BP(mean): --  RR: 18 (08 Apr 2024 05:30) (18 - 18)  SpO2: 100% (08 Apr 2024 05:30) (95% - 100%)    Parameters below as of 08 Apr 2024 05:30  Patient On (Oxygen Delivery Method): room air        GENERAL: NAD, lying in bed comfortably  HEAD: Atraumatic, normocephalic  EYES: EOMI, conjunctiva and sclera clear  ENT: Moist mucous membranes  NECK: Supple, no JVD  HEART: S1, S2, Regular rate and rhythm, no murmurs, rubs, or gallops  LUNGS: Unlabored respirations, clear to auscultation bilaterally, no crackles, wheezing, or rhonchi  ABDOMEN: Soft, nontender, nondistended  EXTREMITIES: No LE edema  NERVOUS SYSTEM:  A&Ox3, no focal deficits, sensation and motor intact bilaterally, coordination testing intact  SKIN: No rashes or lesions    LABS:                        15.2   7.88  )-----------( 87       ( 06 Apr 2024 12:46 )             44.4     04-06    134<L>  |  97<L>  |  17  ----------------------------<  132<H>  3.7   |  23  |  0.99    Ca    8.8      06 Apr 2024 12:46    TPro  7.4  /  Alb  3.1<L>  /  TBili  0.7  /  DBili  x   /  AST  105<H>  /  ALT  75<H>  /  AlkPhos  187<H>  04-06    PT/INR - ( 06 Apr 2024 19:35 )   PT: 12.8 sec;   INR: 1.14 ratio         PTT - ( 06 Apr 2024 19:35 )  PTT:34.4 sec      Urinalysis Basic - ( 06 Apr 2024 12:46 )    Color: x / Appearance: x / SG: x / pH: x  Gluc: 132 mg/dL / Ketone: x  / Bili: x / Urobili: x   Blood: x / Protein: x / Nitrite: x   Leuk Esterase: x / RBC: x / WBC x   Sq Epi: x / Non Sq Epi: x / Bacteria: x        Culture - CSF with Gram Stain (collected 06 Apr 2024 21:32)  Source: .CSF CSF  Gram Stain (07 Apr 2024 00:47):    polymorphonuclear leukocytes seen    No organisms seen    by cytocentrifuge  Preliminary Report (07 Apr 2024 15:38):    No growth to date.    Culture - Blood (collected 06 Apr 2024 19:30)  Source: .Blood Blood-Peripheral  Preliminary Report (08 Apr 2024 02:03):    No growth at 24 hours    Culture - Blood (collected 06 Apr 2024 19:15)  Source: .Blood Blood-Peripheral  Preliminary Report (08 Apr 2024 02:03):    No growth at 24 hours        RADIOLOGY & ADDITIONAL TESTS:  Results Reviewed:   Imaging Personally Reviewed:  Electrocardiogram Personally Reviewed:  Tele:

## 2024-04-08 NOTE — PROGRESS NOTE ADULT - SUBJECTIVE AND OBJECTIVE BOX
*************************************  NEUROLOGY PROGRESS NOTE  **************************************    GAURANG TYLER  Male  [id number]    Interval History: MRI brain completed.     Subjective: Patient stated that his "brain fog" has improved but is not completely back to baseline. HA, dizziness and vision changes have resolved.  Able to ambulate without difficulty       VITAL SIGNS:  Vital Signs Last 24 Hrs  T(C): 36.8 (08 Apr 2024 05:30), Max: 37 (07 Apr 2024 16:00)  T(F): 98.2 (08 Apr 2024 05:30), Max: 98.6 (07 Apr 2024 16:00)  HR: 87 (08 Apr 2024 05:30) (80 - 87)  BP: 127/90 (08 Apr 2024 05:30) (104/78 - 127/90)  BP(mean): --  RR: 18 (08 Apr 2024 05:30) (18 - 18)  SpO2: 100% (08 Apr 2024 05:30) (96% - 100%)    Parameters below as of 08 Apr 2024 05:30  Patient On (Oxygen Delivery Method): room air        PHYSICAL EXAMINATION:  General: Well-developed, well nourished, in no acute distress.  Eyes: Conjunctiva and sclera clear.  Neck: supple, nontender, good ROM  Lungs: no respiratory distress  Neurologic:  - Mental Status:  Alert, awake, oriented to person, place, and time; Speech is fluent with intact naming, repetition, and comprehension. Able to count qrs in $1.75. able to spell world backwards and knows past presidents   - Cranial Nerves II-XII:  VFF, No nystagmus or APD noted, EOMI, PERRLA, V1-V3 intact, no facial asymmetry, t/p midline, SCM/trap intact.  - Motor:  Strength is 5/5 throughout.  There is no pronator drift.  Normal muscle bulk and tone throughout.  - Reflexes:  2+ and symmetric at the triceps, brachioradialis, knees, and ankles. L biceps 1+ R 2+. Plantar responses flexor.  - Sensory:  Intact to light touch sense throughout.  - Coordination:  Finger-nose-finger intact without dysmetria.    - Gait:   Normal steps, base, arm swing, and turning.  Heel and toe walking are normal.  Tandem gait is normal.  Romberg testing is negative.    LABS:                          15.4   8.80  )-----------( 168      ( 08 Apr 2024 06:52 )             46.7     04-08    145  |  107  |  16  ----------------------------<  126<H>  3.3<L>   |  25  |  1.21    Ca    9.0      08 Apr 2024 06:52  Phos  2.9     04-08  Mg     2.50     04-08    TPro  6.8  /  Alb  3.3  /  TBili  0.5  /  DBili  x   /  AST  87<H>  /  ALT  113<H>  /  AlkPhos  178<H>  04-08    PT/INR - ( 06 Apr 2024 19:35 )   PT: 12.8 sec;   INR: 1.14 ratio         PTT - ( 06 Apr 2024 19:35 )  PTT:34.4 sec      RADIOLOGY & ADDITIONAL STUDIES:        < from: CT Head No Cont (04.04.24 @ 16:45) >  IMPRESSION:    No acute intracranial findings.    < end of copied text >  < from: CT Angio Neck w/ IV Cont (04.06.24 @ 16:55) >    IMPRESSION:    CTA BRAIN: Patent intracranial circulation. No flow-limiting stenosis or   occlusion.    CTA NECK: Patent cervical vasculature. No flow limiting stenosis or   occlusion.    Increased number of nonspecific appearing subcentimeter short axis and   mildly enlarged cervical lymph nodes bilaterally may be reactive.   Correlate for clinical signs of infection.    < end of copied text >  < from: MR Head w/wo IV Cont (04.07.24 @ 13:06) >  FINDINGS:  No abnormal leptomeningeal or parenchymal enhancement. Partially empty   sella.  There is no abnormal restricted diffusion to suggest acute infarction.   Scattered periventricular and subcortical white matter T2 /FLAIR   hyperintensities are seen without mass effect, nonspecific, likely   representing severe chronic white matter changes. Differential diagnosis   includes infection, inflammation, autoimmune, vascular,   leukoencephalopathies, dysmyelinating disorders, and demyelinating   disease etiologies.  Normal T2 flow-voids are seen within  the   intracranial vasculature. The lateral ventricles and cortical sulci are   age-appropriate in size and configuration. There is no mass, mass effect,   or extra-axial fluid collection. There is no susceptibility artifact to   suggest hemorrhage. Midline structures are normal.  The visualized   paranasal sinuses, mastoid air cells and orbits are unremarkable.      IMPRESSION: No acute infarction. No abnormal enhancement. Severe chronic   white matter changes. Differential diagnosis above.    < end of copied text >

## 2024-04-08 NOTE — PROGRESS NOTE ADULT - PROBLEM SELECTOR PLAN 3
- , , ALT 75 with R factor 1.2, suggestive of cholestatic pattern. Will obtain US abdomen to r/o gallbladder pathology - , , ALT 75 with R factor 1.2, suggestive of cholestatic pattern  - RUQ US with cholelithiasis  - Likely DILI, potentially 2/2 ceftriaxone. Will CTM LFTs

## 2024-04-09 ENCOUNTER — TRANSCRIPTION ENCOUNTER (OUTPATIENT)
Age: 49
End: 2024-04-09

## 2024-04-09 LAB
ALBUMIN SERPL ELPH-MCNC: 3.2 G/DL — LOW (ref 3.3–5)
ALP SERPL-CCNC: 159 U/L — HIGH (ref 40–120)
ALT FLD-CCNC: 115 U/L — HIGH (ref 4–41)
ANION GAP SERPL CALC-SCNC: 14 MMOL/L — SIGNIFICANT CHANGE UP (ref 7–14)
APTT BLD: 32 SEC — SIGNIFICANT CHANGE UP (ref 24.5–35.6)
AST SERPL-CCNC: 71 U/L — HIGH (ref 4–40)
BILIRUB SERPL-MCNC: 0.6 MG/DL — SIGNIFICANT CHANGE UP (ref 0.2–1.2)
BUN SERPL-MCNC: 12 MG/DL — SIGNIFICANT CHANGE UP (ref 7–23)
CALCIUM SERPL-MCNC: 8.7 MG/DL — SIGNIFICANT CHANGE UP (ref 8.4–10.5)
CHLORIDE SERPL-SCNC: 107 MMOL/L — SIGNIFICANT CHANGE UP (ref 98–107)
CO2 SERPL-SCNC: 23 MMOL/L — SIGNIFICANT CHANGE UP (ref 22–31)
CREAT SERPL-MCNC: 1.08 MG/DL — SIGNIFICANT CHANGE UP (ref 0.5–1.3)
EBV PCR: SIGNIFICANT CHANGE UP IU/ML
EGFR: 85 ML/MIN/1.73M2 — SIGNIFICANT CHANGE UP
GLUCOSE SERPL-MCNC: 137 MG/DL — HIGH (ref 70–99)
HCT VFR BLD CALC: 41.8 % — SIGNIFICANT CHANGE UP (ref 39–50)
HGB BLD-MCNC: 13.9 G/DL — SIGNIFICANT CHANGE UP (ref 13–17)
INR BLD: 1.19 RATIO — HIGH (ref 0.85–1.18)
MAGNESIUM SERPL-MCNC: 2.2 MG/DL — SIGNIFICANT CHANGE UP (ref 1.6–2.6)
MCHC RBC-ENTMCNC: 27.5 PG — SIGNIFICANT CHANGE UP (ref 27–34)
MCHC RBC-ENTMCNC: 33.3 GM/DL — SIGNIFICANT CHANGE UP (ref 32–36)
MCV RBC AUTO: 82.6 FL — SIGNIFICANT CHANGE UP (ref 80–100)
NRBC # BLD: 0 /100 WBCS — SIGNIFICANT CHANGE UP (ref 0–0)
NRBC # FLD: 0 K/UL — SIGNIFICANT CHANGE UP (ref 0–0)
PHOSPHATE SERPL-MCNC: 4 MG/DL — SIGNIFICANT CHANGE UP (ref 2.5–4.5)
PLATELET # BLD AUTO: 221 K/UL — SIGNIFICANT CHANGE UP (ref 150–400)
POTASSIUM SERPL-MCNC: 3.9 MMOL/L — SIGNIFICANT CHANGE UP (ref 3.5–5.3)
POTASSIUM SERPL-SCNC: 3.9 MMOL/L — SIGNIFICANT CHANGE UP (ref 3.5–5.3)
PROT SERPL-MCNC: 6.3 G/DL — SIGNIFICANT CHANGE UP (ref 6–8.3)
PROTHROM AB SERPL-ACNC: 13.3 SEC — HIGH (ref 9.5–13)
RBC # BLD: 5.06 M/UL — SIGNIFICANT CHANGE UP (ref 4.2–5.8)
RBC # FLD: 14.9 % — HIGH (ref 10.3–14.5)
SODIUM SERPL-SCNC: 144 MMOL/L — SIGNIFICANT CHANGE UP (ref 135–145)
WBC # BLD: 7.8 K/UL — SIGNIFICANT CHANGE UP (ref 3.8–10.5)
WBC # FLD AUTO: 7.8 K/UL — SIGNIFICANT CHANGE UP (ref 3.8–10.5)
WNV IGG CSF IA-ACNC: SIGNIFICANT CHANGE UP
WNV IGM CSF IA-ACNC: NEGATIVE — SIGNIFICANT CHANGE UP

## 2024-04-09 PROCEDURE — 95718 EEG PHYS/QHP 2-12 HR W/VEEG: CPT

## 2024-04-09 PROCEDURE — 99232 SBSQ HOSP IP/OBS MODERATE 35: CPT | Mod: GC

## 2024-04-09 PROCEDURE — 99233 SBSQ HOSP IP/OBS HIGH 50: CPT

## 2024-04-09 RX ADMIN — Medication 650 MILLIGRAM(S): at 06:08

## 2024-04-09 RX ADMIN — Medication 30 MILLILITER(S): at 11:59

## 2024-04-09 RX ADMIN — Medication 264 MILLIGRAM(S): at 06:06

## 2024-04-09 NOTE — PROGRESS NOTE ADULT - PROBLEM SELECTOR PLAN 1
- Patient presenting with fever, AMS, focal neuro symptoms of ataxia, behavioral change suggestive of encephalitis. No meningeal sign on exam    - CSF studies with glucose 67, protein 64, WBC 58  with lymphotic predominance suggestive of viral etiology, less likely bacterial although could be early bacterial etiology      Plan:   - Follow up bcx and infectious CSF studies including cultures, HSV PCR, lyme, [place]   - Serum encephalopathy work up ordered   - MRI brain w/wo IV contrast   - vEEG  - Seizure and fall precautions   - C/w acyclovir per ID recommendations  - DDx viral meninigitis (HSV/VZV vs other aseptic encephalitis) vs autoimmune disease (AIE) vs  toxic metabolic disturbances per neurology - Patient presenting with fever, AMS, focal neuro symptoms of ataxia, behavioral change suggestive of encephalitis. No meningeal sign on exam    - CSF studies with glucose 67, protein 64, WBC 58  with lymphotic predominance suggestive of viral etiology, less likely bacterial although could be early bacterial etiology      Plan:   - Follow up bcx and infectious CSF studies including cultures, HSV PCR, lyme, [place]   - Serum encephalopathy work up ordered   - MRI brain w/wo IV contrast   - vEEG running  - Seizure and fall precautions   - C/w acyclovir per ID recommendations  - DDx viral meninigitis (HSV/VZV vs other aseptic encephalitis) vs autoimmune disease (AIE) vs  toxic metabolic disturbances per neurology - Patient presenting with fever, AMS, focal neuro symptoms of ataxia, behavioral change suggestive of encephalitis. No meningeal sign on exam    - CSF studies with glucose 67, protein 64, WBC 58  with lymphotic predominance suggestive of viral etiology, less likely bacterial although could be early bacterial etiology      Plan:   - Serum encephalopathy work up ordered   - MRI brain w/wo IV contrast   - vEEG running  - Seizure and fall precautions   - Negative CSF w/u. Can dc acyclovir per ID  - Will f/u with neurology regarding further work-up

## 2024-04-09 NOTE — PROGRESS NOTE ADULT - SUBJECTIVE AND OBJECTIVE BOX
*******************************  Edward Sims, PGY-1  Internal Medicine  Contact via [employer] TEAMS    *******************************    PROGRESS NOTE:     Patient is a 48y old  Male who presents with a chief complaint of fever (08 Apr 2024 12:47)      INTERVAL EVENTS: No acute overnight events.     SUBJECTIVE: Patient seen and examined at bedside. This morning, the patient is comfortable and doing well. No acute complaints. Denies fevers, chills, N/V/D, chest pain, SOB, abdominal pain.    MEDICATIONS  (STANDING):  acyclovir IVPB 700 milliGRAM(s) IV Intermittent every 8 hours  acyclovir IVPB      sodium chloride 0.9%. 1000 milliLiter(s) (100 mL/Hr) IV Continuous <Continuous>    MEDICATIONS  (PRN):  acetaminophen     Tablet .. 650 milliGRAM(s) Oral every 6 hours PRN Temp greater or equal to 38C (100.4F), Mild Pain (1 - 3)  aluminum hydroxide/magnesium hydroxide/simethicone Suspension 30 milliLiter(s) Oral every 4 hours PRN Dyspepsia  ondansetron Injectable 4 milliGRAM(s) IV Push every 8 hours PRN Nausea and/or Vomiting      CAPILLARY BLOOD GLUCOSE        I&O's Summary    08 Apr 2024 07:01  -  09 Apr 2024 06:29  --------------------------------------------------------  IN: 0 mL / OUT: 900 mL / NET: -900 mL        PHYSICAL EXAM:  Vital Signs Last 24 Hrs  T(C): 36.8 (09 Apr 2024 05:20), Max: 37 (08 Apr 2024 12:37)  T(F): 98.2 (09 Apr 2024 05:20), Max: 98.6 (08 Apr 2024 12:37)  HR: 79 (09 Apr 2024 05:20) (79 - 90)  BP: 126/93 (09 Apr 2024 05:20) (121/60 - 149/74)  BP(mean): --  RR: 18 (09 Apr 2024 05:20) (17 - 18)  SpO2: 100% (09 Apr 2024 05:20) (95% - 100%)    Parameters below as of 09 Apr 2024 05:20  Patient On (Oxygen Delivery Method): room air        GENERAL: NAD, lying in bed comfortably  HEAD: Atraumatic, normocephalic  EYES: EOMI, PERRLA, conjunctiva and sclera clear  ENT: Moist mucous membranes  NECK: Supple, no JVD  HEART: S1, S2, Regular rate and rhythm, no murmurs, rubs, or gallops  LUNGS: Unlabored respirations, clear to auscultation bilaterally, no crackles, wheezing, or rhonchi  ABDOMEN: Soft, nontender, nondistended, +BS  EXTREMITIES: 2+ peripheral pulses bilaterally. No clubbing, cyanosis, or edema  NERVOUS SYSTEM:  A&Ox3, no focal deficits   SKIN: No rashes or lesions    LABS:                        15.4   8.80  )-----------( 168      ( 08 Apr 2024 06:52 )             46.7     04-08    145  |  107  |  16  ----------------------------<  126<H>  3.3<L>   |  25  |  1.21    Ca    9.0      08 Apr 2024 06:52  Phos  2.9     04-08  Mg     2.50     04-08    TPro  6.8  /  Alb  3.3  /  TBili  0.5  /  DBili  x   /  AST  87<H>  /  ALT  113<H>  /  AlkPhos  178<H>  04-08          Urinalysis Basic - ( 08 Apr 2024 06:52 )    Color: x / Appearance: x / SG: x / pH: x  Gluc: 126 mg/dL / Ketone: x  / Bili: x / Urobili: x   Blood: x / Protein: x / Nitrite: x   Leuk Esterase: x / RBC: x / WBC x   Sq Epi: x / Non Sq Epi: x / Bacteria: x        Culture - CSF with Gram Stain (collected 06 Apr 2024 21:32)  Source: .CSF CSF  Gram Stain (07 Apr 2024 00:47):    polymorphonuclear leukocytes seen    No organisms seen    by cytocentrifuge  Preliminary Report (07 Apr 2024 15:38):    No growth to date.    Culture - Blood (collected 06 Apr 2024 19:30)  Source: .Blood Blood-Peripheral  Preliminary Report (09 Apr 2024 02:03):    No growth at 48 Hours    Culture - Blood (collected 06 Apr 2024 19:15)  Source: .Blood Blood-Peripheral  Preliminary Report (09 Apr 2024 02:03):    No growth at 48 Hours        RADIOLOGY & ADDITIONAL TESTS:  Results Reviewed:   Imaging Personally Reviewed:  Electrocardiogram Personally Reviewed:  Tele: *******************************  Edward Sims, PGY-1  Internal Medicine  Contact via [employer] TEAMS    *******************************    PROGRESS NOTE:     Patient is a 48y old  Male who presents with a chief complaint of fever (08 Apr 2024 12:47)      INTERVAL EVENTS: No acute overnight events.     SUBJECTIVE: Patient seen and examined at bedside. This morning, the patient is comfortable and doing well. No acute complaints. Denies fevers, chills, N/V/D, chest pain, SOB, abdominal pain. States his headache is resolved and his brain fog is improving. He had a stomach cramp yesterday which is now better.    MEDICATIONS  (STANDING):  acyclovir IVPB 700 milliGRAM(s) IV Intermittent every 8 hours  acyclovir IVPB      sodium chloride 0.9%. 1000 milliLiter(s) (100 mL/Hr) IV Continuous <Continuous>    MEDICATIONS  (PRN):  acetaminophen     Tablet .. 650 milliGRAM(s) Oral every 6 hours PRN Temp greater or equal to 38C (100.4F), Mild Pain (1 - 3)  aluminum hydroxide/magnesium hydroxide/simethicone Suspension 30 milliLiter(s) Oral every 4 hours PRN Dyspepsia  ondansetron Injectable 4 milliGRAM(s) IV Push every 8 hours PRN Nausea and/or Vomiting      CAPILLARY BLOOD GLUCOSE        I&O's Summary    08 Apr 2024 07:01  -  09 Apr 2024 06:29  --------------------------------------------------------  IN: 0 mL / OUT: 900 mL / NET: -900 mL        PHYSICAL EXAM:  Vital Signs Last 24 Hrs  T(C): 36.8 (09 Apr 2024 05:20), Max: 37 (08 Apr 2024 12:37)  T(F): 98.2 (09 Apr 2024 05:20), Max: 98.6 (08 Apr 2024 12:37)  HR: 79 (09 Apr 2024 05:20) (79 - 90)  BP: 126/93 (09 Apr 2024 05:20) (121/60 - 149/74)  BP(mean): --  RR: 18 (09 Apr 2024 05:20) (17 - 18)  SpO2: 100% (09 Apr 2024 05:20) (95% - 100%)    Parameters below as of 09 Apr 2024 05:20  Patient On (Oxygen Delivery Method): room air        GENERAL: NAD, lying in bed comfortably  HEAD: Atraumatic, normocephalic  EYES: EOMI, conjunctiva and sclera clear  ENT: Moist mucous membranes  NECK: Supple, no JVD  HEART: S1, S2, Regular rate and rhythm, no murmurs, rubs, or gallops  LUNGS: Unlabored respirations, clear to auscultation bilaterally, no crackles, wheezing, or rhonchi  ABDOMEN: Soft, nontender, nondistended  EXTREMITIES: No LE edema  NERVOUS SYSTEM:  A&Ox3, no focal deficits  SKIN: No rashes or lesions    LABS:                        15.4   8.80  )-----------( 168      ( 08 Apr 2024 06:52 )             46.7     04-08    145  |  107  |  16  ----------------------------<  126<H>  3.3<L>   |  25  |  1.21    Ca    9.0      08 Apr 2024 06:52  Phos  2.9     04-08  Mg     2.50     04-08    TPro  6.8  /  Alb  3.3  /  TBili  0.5  /  DBili  x   /  AST  87<H>  /  ALT  113<H>  /  AlkPhos  178<H>  04-08          Urinalysis Basic - ( 08 Apr 2024 06:52 )    Color: x / Appearance: x / SG: x / pH: x  Gluc: 126 mg/dL / Ketone: x  / Bili: x / Urobili: x   Blood: x / Protein: x / Nitrite: x   Leuk Esterase: x / RBC: x / WBC x   Sq Epi: x / Non Sq Epi: x / Bacteria: x        Culture - CSF with Gram Stain (collected 06 Apr 2024 21:32)  Source: .CSF CSF  Gram Stain (07 Apr 2024 00:47):    polymorphonuclear leukocytes seen    No organisms seen    by cytocentrifuge  Preliminary Report (07 Apr 2024 15:38):    No growth to date.    Culture - Blood (collected 06 Apr 2024 19:30)  Source: .Blood Blood-Peripheral  Preliminary Report (09 Apr 2024 02:03):    No growth at 48 Hours    Culture - Blood (collected 06 Apr 2024 19:15)  Source: .Blood Blood-Peripheral  Preliminary Report (09 Apr 2024 02:03):    No growth at 48 Hours        RADIOLOGY & ADDITIONAL TESTS:  Results Reviewed:   Imaging Personally Reviewed:  Electrocardiogram Personally Reviewed:  Tele:

## 2024-04-09 NOTE — DISCHARGE NOTE PROVIDER - CARE PROVIDER_API CALL
francisco j jensen  Phone: [phone]  Fax: (   )    -  Established Patient  Follow Up Time: 2 weeks

## 2024-04-09 NOTE — EEG REPORT - NS EEG TEXT BOX
REPORT OF CONTINUOUS VIDEO EEG      [facility]: [address], [place] [address], Phone: [phone]  [facility]: [id number] [facility], [place] [address], Phone: [phone]  [facility]: [address] [place] [address], Phone: [phone]    Patient Name: GAURANG TYLER  Age: 48 year, : 1975  MRN #: -, Vargas: [id number]  Referring Physician: -  EEG #: [id number]    Study Date: 2024   Start Time: 1:03:53 PM      End Date: 2024         End Time: 08:00:02 AM     Study Duration: 18 HR 40 MN    Study Information:    EEG Recording Technique:  The patient underwent continuous Video-EEG monitoring, using Telemetry System hardware on the XLTek Digital System. EEG and video data were stored on a computer hard drive with important events saved in digital archive files. The material was reviewed by a physician (electroencephalographer / epileptologist) on a daily basis. Keanu and seizure detection algorithms were utilized and reviewed. An EEG Technician attended to the patient, and was available throughout daytime work hours.  The epilepsy center neurologist was available in person or on call 24-hours per day.    EEG Placement and Labeling of Electrodes:  The EEG was performed utilizing 20 channel referential EEG connections (coronal over temporal over parasagittal montage) using all standard 10-20 electrode placements with EKG, with additional electrodes placed in the inferior temporal region using the modified 10-10 montage electrode placements for elective admissions, or if deemed necessary. Recording was at a sampling rate of 256 samples per second per channel. Time synchronized digital video recording was done simultaneously with EEG recording. A low light infrared camera was used for low light recording.     History:   48M pmhx HLD (on statin), presents with multiple neurologic issues, including HA, double vision, gait instability, and (most recently) altered mental status.    Medication  Aluminum Hydroxide    Tylenol    Sodium Chloride    ACYCLOVIR      Interpretation:    [[[Abbreviation Key:  PDR=alpha rhythm/posterior dominant rhythm. A-P=anterior posterior.  Amplitude: ‘very low’:<20; ‘low’:20-49; ‘medium’:; ‘high’:>150uV.  Persistence for periodic/rhythmic patterns (% of epoch) ‘rare’:<1%; ‘occasional’:1-10%; ‘frequent’:10-50%; ‘abundant’:50-90%; ‘continuous’:>90%.  Persistence for sporadic discharges: ‘rare’:<1/hr; ‘occasional’:1/min-1/hr; ‘frequent’:>1/min; ‘abundant’:>1/10 sec.  RPP=rhythmic and periodic patterns; GRDA=generalized rhythmic delta activity; FIRDA=frontal intermittent GRDA; LRDA=lateralized rhythmic delta activity; TIRDA=temporal intermittent rhythmic delta activity;  LPD=PLED=lateralized periodic discharges; GPD=generalized periodic discharges; BIPDs =bilateral independent periodic discharges; Mf=multifocal; SIRPDs=stimulus induced rhythmic, periodic, or ictal appearing discharges; BIRDs=brief potentially ictal rhythmic discharges >4 Hz, lasting .5-10s; PFA (paroxysmal bursts >13 Hz or =8 Hz <10s).  Modifiers: +F=with fast component; +S=with spike component; +R=with rhythmic component.  S-B=burst suppression pattern.  Max=maximal. N1-drowsy; N2-stage II sleep; N3-slow wave sleep. SSS/BETS=small sharp spikes/benign epileptiform transients of sleep. HV=hyperventilation; PS=photic stimulation]]]      Daily EEG Visual Analysis    FINDINGS:      Background:  Symmetry: symmetric  Continuous: continuous  PDR: symmetric, well-modulated 10-11 Hz activity, with amplitude to 40 uV, that attenuated to eye opening.  Low amplitude frontal beta noted in wakefulness.  Reactivity: present  Voltage: normal, [defined typically between 20-150uV]  Anterior Posterior Gradient: present  Breach: absent    Background Slowing:  Generalized slowing: none was present.  Focal slowing: none.    State Changes:   -Drowsiness was characterized by fragmentation, attenuation, and slowing of the background activity.    -N2 sleep transients with symmetric spindles and K-complexes.    Sporadic Epileptiform Discharges:   None    Rhythmic and Periodic Patterns (RPPs):  None     Electrographic and Electroclinical seizures:  None    Other Clinical Events:  None    Activation Procedures:   -Hyperventilation was not performed.    -Photic stimulation was performed and did not elicit any abnormalities.      Artifacts:  Intermittent myogenic and movement artifacts were noted.    ECG:  The heart rate on single channel ECG was predominantly between 70-90 BPM.    EEG Summary / Classification:  Normal EEG in the awake , drowsy and asleep states.    EEG Impression / Clinical Correlate:  No epileptic discharges recorded.  No seizures recorded.  ________________________________________    Brandon Kang MD  Research EEG Fellow, [facility]ilepsy [place]    Waldo Aleman MD  EEG/Epilepsy Attending   ------------------------------------  EEG Reading Room: [phone]  On Call Service After Hours: [phone]      REPORT OF CONTINUOUS VIDEO EEG      [facility]: [address], [place] [address], Phone: [phone]  [facility]: [address], [place] [address], Phone: [phone]  [facility]: [address] [place] [address], Phone: [phone]    Patient Name: GAURANG TYLER  Age: 48 year, : 1975  MRN #: -, Vargas: [id number]  Referring Physician: -  EEG #: [id number]    Study Date: 2024   Start Time: 1:00 PM      End Date: 2024         End Time: 17:15  Study Duration: 28HR 15min    Study Information:    EEG Recording Technique:  The patient underwent continuous Video-EEG monitoring, using Telemetry System hardware on the XLTek Digital System. EEG and video data were stored on a computer hard drive with important events saved in digital archive files. The material was reviewed by a physician (electroencephalographer / epileptologist) on a daily basis. Keanu and seizure detection algorithms were utilized and reviewed. An EEG Technician attended to the patient, and was available throughout daytime work hours.  The epilepsy center neurologist was available in person or on call 24-hours per day.    EEG Placement and Labeling of Electrodes:  The EEG was performed utilizing 20 channel referential EEG connections (coronal over temporal over parasagittal montage) using all standard 10-20 electrode placements with EKG, with additional electrodes placed in the inferior temporal region using the modified 10-10 montage electrode placements for elective admissions, or if deemed necessary. Recording was at a sampling rate of 256 samples per second per channel. Time synchronized digital video recording was done simultaneously with EEG recording. A low light infrared camera was used for low light recording.     History:   48M pmhx HLD (on statin), presents with multiple neurologic issues, including HA, double vision, gait instability, and (most recently) altered mental status.    Medication  Aluminum Hydroxide    Tylenol    Sodium Chloride    ACYCLOVIR      Interpretation:    [[[Abbreviation Key:  PDR=alpha rhythm/posterior dominant rhythm. A-P=anterior posterior.  Amplitude: ‘very low’:<20; ‘low’:20-49; ‘medium’:; ‘high’:>150uV.  Persistence for periodic/rhythmic patterns (% of epoch) ‘rare’:<1%; ‘occasional’:1-10%; ‘frequent’:10-50%; ‘abundant’:50-90%; ‘continuous’:>90%.  Persistence for sporadic discharges: ‘rare’:<1/hr; ‘occasional’:1/min-1/hr; ‘frequent’:>1/min; ‘abundant’:>1/10 sec.  RPP=rhythmic and periodic patterns; GRDA=generalized rhythmic delta activity; FIRDA=frontal intermittent GRDA; LRDA=lateralized rhythmic delta activity; TIRDA=temporal intermittent rhythmic delta activity;  LPD=PLED=lateralized periodic discharges; GPD=generalized periodic discharges; BIPDs =bilateral independent periodic discharges; Mf=multifocal; SIRPDs=stimulus induced rhythmic, periodic, or ictal appearing discharges; BIRDs=brief potentially ictal rhythmic discharges >4 Hz, lasting .5-10s; PFA (paroxysmal bursts >13 Hz or =8 Hz <10s).  Modifiers: +F=with fast component; +S=with spike component; +R=with rhythmic component.  S-B=burst suppression pattern.  Max=maximal. N1-drowsy; N2-stage II sleep; N3-slow wave sleep. SSS/BETS=small sharp spikes/benign epileptiform transients of sleep. HV=hyperventilation; PS=photic stimulation]]]      Daily EEG Visual Analysis    FINDINGS:      Background:  Symmetry: symmetric  Continuous: continuous  PDR: symmetric, well-modulated 10-11 Hz activity, with amplitude to 40 uV, that attenuated to eye opening.  Low amplitude frontal beta noted in wakefulness.  Reactivity: present  Voltage: normal, [defined typically between 20-150uV]  Anterior Posterior Gradient: present  Breach: absent    Background Slowing:  Generalized slowing: none was present.  Focal slowing: none.    State Changes:   -Drowsiness was characterized by fragmentation, attenuation, and slowing of the background activity.    -N2 sleep transients with symmetric spindles and K-complexes.    Sporadic Epileptiform Discharges:   None    Rhythmic and Periodic Patterns (RPPs):  None     Electrographic and Electroclinical seizures:  None    Other Clinical Events:  None    Activation Procedures:   -Hyperventilation was not performed.    -Photic stimulation was performed and did not elicit any abnormalities.      Artifacts:  Intermittent myogenic and movement artifacts were noted.    ECG:  The heart rate on single channel ECG was predominantly between 70-90 BPM.    EEG Summary / Classification:  Normal EEG in the awake , drowsy and asleep states.    EEG Impression / Clinical Correlate:  No epileptic discharges recorded.  No seizures recorded.  ________________________________________    Brandon Kang MD  Research EEG Fellow, [facility] Epilepsy [place]    Waldo Aleman MD  EEG/Epilepsy Attending   ------------------------------------  EEG Reading Room: [phone]  On Call Service After Hours: [phone]

## 2024-04-09 NOTE — DIETITIAN INITIAL EVALUATION ADULT - PERTINENT MEDS FT
MEDICATIONS  (STANDING):  sodium chloride 0.9%. 1000 milliLiter(s) (100 mL/Hr) IV Continuous <Continuous>    MEDICATIONS  (PRN):  acetaminophen     Tablet .. 650 milliGRAM(s) Oral every 6 hours PRN Temp greater or equal to 38C (100.4F), Mild Pain (1 - 3)  aluminum hydroxide/magnesium hydroxide/simethicone Suspension 30 milliLiter(s) Oral every 4 hours PRN Dyspepsia  ondansetron Injectable 4 milliGRAM(s) IV Push every 8 hours PRN Nausea and/or Vomiting

## 2024-04-09 NOTE — DIETITIAN INITIAL EVALUATION ADULT - PROBLEM SELECTOR PLAN 1
- Patient presenting with fever, AMS, focal neuro symptoms of ataxia, behavioral change suggestive of encephalitis. No meningeal sign on exam    - CSF studies with glucose 67, protein 64, WBC 58  with lymphotic predominance suggestive of viral etiology, less likely bacterial although could be early bacterial etiology      Plan:   - Follow up bcx and infectious CSF studies including cultures, HSV PCR, lyme, [place]   - Serum encephalopathy work up ordered   - MRI brain w/wo IV contrast   - vEEG  - Seizure and fall precautions   - Empiric treatment with vanco (20 mg/kg q12h) , ceftriaxone (2g q12h) , and acyclovir (10mg/kg q8h) given concern for HSV encephalitis  - ID consult in the AM

## 2024-04-09 NOTE — DISCHARGE NOTE PROVIDER - ATTENDING DISCHARGE PHYSICAL EXAMINATION:
.  VITAL SIGNS:  T(C): 37 (04-10-24 @ 13:19), Max: 37.7 (04-09-24 @ 18:30)  T(F): 98.6 (04-10-24 @ 13:19), Max: 99.8 (04-09-24 @ 18:30)  HR: 84 (04-10-24 @ 13:19) (62 - 96)  BP: 131/94 (04-10-24 @ 13:19) (131/84 - 131/103)  BP(mean): --  RR: 18 (04-10-24 @ 13:19) (18 - 18)  SpO2: 98% (04-10-24 @ 13:19) (97% - 99%)  Wt(kg): --    PHYSICAL EXAM:    Constitutional: WDWN resting comfortably in bed;  Head: NC/AT  Eyes: PERRL, EOMI, clear conjunctiva  ENT: no nasal discharge; uvula midline, no oropharyngeal erythema or exudates;  Neck: supple;   Respiratory: CTA B/L; no W/R/R, no retractions  Cardiac: +S1/S2; RRR; no M/R/G; PMI non-displaced  Gastrointestinal: soft, NT/ND; no rebound or guarding; +BSx4  Genitourinary: normal external genitalia  Back: spine midline, no bony tenderness or step-offs; no CVAT B/L  Extremities: WWP, no clubbing or cyanosis; no peripheral edema  Musculoskeletal: NROM x4; no joint swelling, tenderness or erythema  Neurologic: AAOx3; CNII-XII grossly intact; no focal deficits  Psychiatric: affect and characteristics of appearance, verbalizations, behaviors are appropriate

## 2024-04-09 NOTE — DISCHARGE NOTE PROVIDER - NSDCCPTREATMENT_GEN_ALL_CORE_FT
PRINCIPAL PROCEDURE  Procedure: MRI head  Findings and Treatment: FINDINGS:  No abnormal leptomeningeal or parenchymal enhancement. Partially empty   sella.  There is no abnormal restricted diffusion to suggest acute infarction.   Scattered periventricular and subcortical white matter T2 /FLAIR   hyperintensities are seen without mass effect, nonspecific, likely   representing severe chronic white matter changes. Differential diagnosis   includes infection, inflammation, autoimmune, vascular,   leukoencephalopathies, dysmyelinating disorders, and demyelinating   disease etiologies.  Normal T2 flow-voids are seen within  the   intracranial vasculature. The lateral ventricles and cortical sulci are   age-appropriate in size and configuration. There is no mass, mass effect,   or extra-axial fluid collection. There is no susceptibility artifact to   suggest hemorrhage. Midline structures are normal.  The visualized   paranasal sinuses, mastoid air cells and orbits are unremarkable.  IMPRESSION: No acute infarction. No abnormal enhancement. Severe chronic   white matter changes. Differential diagnosis above.        SECONDARY PROCEDURE  Procedure: CT angiogram head and neck vessels w contrast  Findings and Treatment: IMPRESSION:  CTA BRAIN: Patent intracranial circulation. No flow-limiting stenosis or   occlusion.  CTA NECK: Patent cervical vasculature. No flow limiting stenosis or   occlusion.  Increased number of nonspecific appearing subcentimeter short axis and   mildly enlarged cervical lymph nodes bilaterally may be reactive.   Correlate for clinical signs of infection.      Procedure: CT head  Findings and Treatment: FINDINGS:  There is no evidence of intraparenchymal or extraaxial hemorrhage.     There is no CT evidence of large vessel acute infarct. No mass effect is   found in the brain.  No evidence of midline shift or herniation pattern.  The ventricles, sulci and basal cisterns appear unremarkable.  Visualized paranasal sinuses are clear.  IMPRESSION:  No acute intracranial findings.

## 2024-04-09 NOTE — DISCHARGE NOTE PROVIDER - PROVIDER TOKENS
FREE:[LAST:[mikey],FIRST:[francisco j],PHONE:[phone]],FAX:[(   )    -],FOLLOWUP:[2 weeks],ESTABLISHEDPATIENT:[T]]

## 2024-04-09 NOTE — DIETITIAN INITIAL EVALUATION ADULT - PROBLEM SELECTOR PLAN 2
Last Visit Date: 6/29/2022   Next Visit Date: 9/29/2022 - plt 87, possibly in setting of sepsis. Will obtain blue top

## 2024-04-09 NOTE — PROGRESS NOTE ADULT - ASSESSMENT
This is a 47 y/o M w/ PMHx of HLD who is presenting to [facility] on 4/6/24 for HA, vomiting, and unsteady gait. Pt states he has been feelign unwell since Monday, had pressure in his sinus. He went to  on Thursday, dx with sinusitis, recieved aumgnetin, however did not have any imrpovement. Started to have fevers, chills, unstable gait,     In the ER, pt was febrile to 100.9, other vitals stable. Labs w/o leukocytosis, transaminitis. U/A w/o signs of infection, COVID/Flu/RSV negative. CT angio brain/neck with mild cervical LAD.   Pt had LP in the ED, no opening pressure noted, Glucose 67, protein 64, nucleated cells 58, 49% lymphocytes, gram stain negative. Started on empiric Vancomycin, Ceftriaxone, Acyclovir.    #Encephalitis   #Fever   #VALDEZ    Overall, 47 y/o M w/ PMHx of HLD who is presenting w/ fevers, HA, gait difficulties, c/f encephalitis. LP with normal glucose, slightly elevated protein, with elevated nucleated cell count, lymphocytic. Pt forgetful, seems to have some change in personality on exam. Suspicious for HSV/VZV vs other aseptic encephalitis. Would continue with Acyclovir, can stop Vancomycin/Ceftriaxone.   F/u meningitis/encephalitis PCR.     CSF meningitis/encephalitis panel negative, however will request HSV1/2 PCR. Will send for syphilis testing (neurosyphillis?), cryptococcus serum Ag. Unknown etiology at this time.     HSV PCR now negative, no temporal findings on MRI, CSF profile w/ pleocytosis, minimal RBCs, minimally elevated protein. Picture is not consistent with HSV encephalitis. Can stop acyclovir     Plan:   1. Can monitor off Acyclovir   2. F/u syphilis test  3. Lyme serum testing in the AM   4. Appreciate neurology recommendations      Thank you for this consult. Inpatient ID team will follow.    Pradeep Venegas M.D.  Attending Physician  Division of Infectious Diseases  Department of Medicine

## 2024-04-09 NOTE — DISCHARGE NOTE PROVIDER - NSDCFUADDAPPT_GEN_ALL_CORE_FT
APPTS ARE READY TO BE MADE: [X] YES    Best Family or Patient Contact (if needed):    Additional Information about above appointments (if needed):    1: Please follow-up with your PCP in 2 weeks  2: Please follow-up with neurology in 1 week  3:     Other comments or requests:    APPTS ARE READY TO BE MADE: [X] YES    Best Family or Patient Contact (if needed):    Additional Information about above appointments (if needed):    1: Please follow-up with your PCP in 2 weeks  2: Please follow-up with neurology in 1 week  3:   [facility] at 10 am Tasked office for a sooner appointment.    Appointment was scheduled but is not visible on [facility] on 04/24 at 11:30am with  at [address] [place] [address] [phone]  Other comments or requests:

## 2024-04-09 NOTE — PROGRESS NOTE ADULT - SUBJECTIVE AND OBJECTIVE BOX
Infectious Diseases Follow Up:    Patient is a 48y old  Male who presents with a chief complaint of fever (09 Apr 2024 06:29)      Interval History/ROS:  Afebrile, no acute events     Allergies  No Known Allergies        ANTIMICROBIALS:  acyclovir IVPB    acyclovir IVPB 700 every 8 hours      Current Abx:     Previous Abx     OTHER MEDS:  MEDICATIONS  (STANDING):  acetaminophen     Tablet .. 650 every 6 hours PRN  aluminum hydroxide/magnesium hydroxide/simethicone Suspension 30 every 4 hours PRN  ondansetron Injectable 4 every 8 hours PRN      Vital Signs Last 24 Hrs  T(C): 36.7 (09 Apr 2024 10:00), Max: 37 (08 Apr 2024 12:37)  T(F): 98.1 (09 Apr 2024 10:00), Max: 98.6 (08 Apr 2024 12:37)  HR: 66 (09 Apr 2024 10:00) (66 - 90)  BP: 130/89 (09 Apr 2024 10:00) (121/60 - 149/74)  BP(mean): --  RR: 18 (09 Apr 2024 10:00) (17 - 18)  SpO2: 100% (09 Apr 2024 10:00) (95% - 100%)    Parameters below as of 09 Apr 2024 10:00  Patient On (Oxygen Delivery Method): room air        PHYSICAL EXAM:  GENERAL: NAD, well-developed  HEAD:  EEG leads   EYES: EOMI, PERRLA, conjunctiva and sclera clear  CHEST/LUNG: Clear to auscultation bilaterally; No wheeze  HEART: Regular rate and rhythm; No murmurs, rubs, or gallops  ABDOMEN: Soft, Nontender, Nondistended; Bowel sounds present  PSYCH: AAOx3                            13.9   7.80  )-----------( 221      ( 09 Apr 2024 06:55 )             41.8       04-09    144  |  107  |  12  ----------------------------<  137<H>  3.9   |  23  |  1.08    Ca    8.7      09 Apr 2024 06:55  Phos  4.0     04-09  Mg     2.20     04-09    TPro  6.3  /  Alb  3.2<L>  /  TBili  0.6  /  DBili  x   /  AST  71<H>  /  ALT  115<H>  /  AlkPhos  159<H>  04-09      Urinalysis Basic - ( 09 Apr 2024 06:55 )    Color: x / Appearance: x / SG: x / pH: x  Gluc: 137 mg/dL / Ketone: x  / Bili: x / Urobili: x   Blood: x / Protein: x / Nitrite: x   Leuk Esterase: x / RBC: x / WBC x   Sq Epi: x / Non Sq Epi: x / Bacteria: x        MICROBIOLOGY:  v  .CSF CSF  04-06-24   No growth to date.  --    polymorphonuclear leukocytes seen  No organisms seen  by cytocentrifuge      .Blood Blood-Peripheral  04-06-24   No growth at 48 Hours  --  --      .Blood Blood-Peripheral  04-06-24   No growth at 48 Hours  --  --          HSV 1/2 PCR: NotDetec (04-08 @ 12:30)        RADIOLOGY:  < from: MR Head w/wo IV Cont (04.07.24 @ 13:06) >  FINDINGS:  No abnormal leptomeningeal or parenchymal enhancement. Partially empty   sella.  There is no abnormal restricted diffusion to suggest acute infarction.   Scattered periventricular and subcortical white matter T2 /FLAIR   hyperintensities are seen without mass effect, nonspecific, likely   representing severe chronic white matter changes. Differential diagnosis   includes infection, inflammation, autoimmune, vascular,   leukoencephalopathies, dysmyelinating disorders, and demyelinating   disease etiologies.  Normal T2 flow-voids are seen within  the   intracranial vasculature. The lateral ventricles and cortical sulci are   age-appropriate in size and configuration. There is no mass, mass effect,   or extra-axial fluid collection. There is no susceptibility artifact to   suggest hemorrhage. Midline structures are normal.  The visualized   paranasal sinuses, mastoid air cells and orbits are unremarkable.      IMPRESSION: No acute infarction. No abnormal enhancement. Severe chronic   white matter changes. Differential diagnosis above.

## 2024-04-09 NOTE — DISCHARGE NOTE PROVIDER - HOSPITAL COURSE
HPI: 48-year-old male PMH of HLD (currently not on medication) presenting with complaints of headache, vomiting, and unsteady gait. Patient reported pressure in his sinuses for which he presented to [facility] on 4/4 where he was diagnosed with sinusitis and was prescribed augmentin. He took the medication that night and the next morning, without any relief and was experiencing fever and chills so he came to the ED today. Family also noted he was having unstable gait holding onto objects and wall to navigate which is different from his baseline.     He presented to [facility] ED for the above. Over the ED course, he started having change in mental status, became more disoriented with trouble concentrating and attention. He was also found to be ataxic, having difficulty coordinating movements. Febrile to 100.7. He underwent LP and was given vanco, ceftriaxone, acyclovir, and dexamethasone for empiric treatment of meningitis.      Patient evaluated at bedside. He felt weak, foggy, tired, and sleepy. Endorsed headache and photophobia.  Denied any sick contacts or recent travel.     Hospital Course:  Patient's lumbar puncture resulted with normal glucose and lymphocytic predominance concerning for viral meningitis vs encephalitis. Cultures from the CSF all came back negative HPI: 48-year-old male PMH of HLD (currently not on medication) presenting with complaints of headache, vomiting, and unsteady gait. Patient reported pressure in his sinuses for which he presented to [facility] on 4/4 where he was diagnosed with sinusitis and was prescribed augmentin. He took the medication that night and the next morning, without any relief and was experiencing fever and chills so he came to the ED today. Family also noted he was having unstable gait holding onto objects and wall to navigate which is different from his baseline.     He presented to [facility] ED for the above. Over the ED course, he started having change in mental status, became more disoriented with trouble concentrating and attention. He was also found to be ataxic, having difficulty coordinating movements. Febrile to 100.7. He underwent LP and was given vancomycin, ceftriaxone, acyclovir, and dexamethasone for empiric treatment of meningitis.      Patient evaluated at bedside. He felt weak, foggy, tired, and sleepy. Endorsed headache and photophobia.  Denied any sick contacts or recent travel.     Hospital Course:  Patient's lumbar puncture resulted with normal glucose and lymphocytic predominance concerning for viral meningitis vs encephalitis. Infectious disease and neurology were consulted. His vancomycin, ceftriaxone, and dexamethasone were all stopped given low suspicion for bacterial meningitis. He was continued on acyclovir. Patient had a brain MRI done which showed non-specific, severe, chronic white matter changes. Neurology stating uncertain etiology potentially 2/2 viral meninigitis (HSV/VZV vs other aseptic encephalitis) vs autoimmune disease (AIE) vs  toxic metabolic disturbances. Cultures, viral testing, and PCR from the CSF all came back negative. Acyclovir was stopped. Clinically, patient improved day by day. He was alert and oriented to person, place, and time by the end of his stay with no neurological deficits on physical exam. Kernig and Brudzinski signs were negative throughout the stay. Subjectively, he noted "brain fog" that was improving daily and stated his headache had resolved. Given clinical improvement and negative CSF studies patient was deemed clinically stable for discharge home with close follow-up with neurology and PCP. He was given clearance for discharge by both infectious disease and neurology.    Follow-up:  -Please follow-up with PCP and neurology HPI: 48-year-old male PMH of HLD (currently not on medication) presenting with complaints of headache, vomiting, and unsteady gait. Patient reported pressure in his sinuses for which he presented to [facility] on 4/4 where he was diagnosed with sinusitis and was prescribed augmentin. He took the medication that night and the next morning, without any relief and was experiencing fever and chills so he came to the ED today. Family also noted he was having unstable gait holding onto objects and wall to navigate which is different from his baseline.     He presented to [facility] ED for the above. Over the ED course, he started having change in mental status, became more disoriented with trouble concentrating and attention. He was also found to be ataxic, having difficulty coordinating movements. Febrile to 100.7. He underwent LP and was given vancomycin, ceftriaxone, acyclovir, and dexamethasone for empiric treatment of meningitis.      Patient evaluated at bedside. He felt weak, foggy, tired, and sleepy. Endorsed headache and photophobia.  Denied any sick contacts or recent travel.     Hospital Course:  Patient's lumbar puncture resulted with normal glucose and lymphocytic predominance concerning for viral meningitis vs encephalitis. Infectious disease and neurology were consulted. His vancomycin, ceftriaxone, and dexamethasone were all stopped given low suspicion for bacterial meningitis. He was continued on acyclovir. Patient had a brain MRI done which showed non-specific, severe, chronic white matter changes. Neurology stating uncertain etiology potentially 2/2 viral meninigitis (HSV/VZV vs other aseptic encephalitis) vs autoimmune disease (AIE) vs  toxic metabolic disturbances. Cultures, viral testing, and PCR from the CSF all came back negative. Acyclovir was stopped. Clinically, patient improved day by day. He was alert and oriented to person, place, and time by the end of his stay with no neurological deficits on physical exam. Kernig and Brudzinski signs were negative throughout the stay. Subjectively, he noted "brain fog" that was improving daily and stated his headache had resolved. Given clinical improvement and negative CSF studies patient was deemed clinically stable for discharge home with close follow-up with neurology and PCP. He was given clearance for discharge by both infectious disease and neurology.    Follow-up:  -Please follow-up with PCP and neurology  -Consider LP for further studies not able to be done inpatient (quantity of fluid not sufficient)

## 2024-04-09 NOTE — DIETITIAN INITIAL EVALUATION ADULT - REASON FOR ADMISSION
Per chart (4/8): 48-year-old male PMH of HLD (currently not on medication) presenting with fever, AMS, and focal neuro symptoms with behavioral change, overall concerning for sepsis in the setting of CNS infection, likely encephalitis vs meningitis.

## 2024-04-09 NOTE — DISCHARGE NOTE PROVIDER - NSDCCPCAREPLAN_GEN_ALL_CORE_FT
PRINCIPAL DISCHARGE DIAGNOSIS  Diagnosis: Suspected infectious encephalitis  Assessment and Plan of Treatment: You came to the hospital because you were having significant headache, fevers, chills, and lack of coordination/balance. In the ED you became quite confused. You were evaluated and the team was concerned for encephalitis (an infection of the brain). To make this diagnosis, we performed a lumbar puncture to obtain spinal fluid. This fluid was sent for analysis and concerning for a viral encephalitis. You were treated with antibiotics and anti-viral medications to cover you for all causes of encephalitis while the work-up was being done. You tested negative for all viruses and bacteria we tested for. You improved clinically and the antibiotics/antivirals were discontinued. Your brain MRI showed non-specific white matter changes. This could have been caused by an infection of the brain or some other pathology. This can be followed up outpatient at the neurology clinic we will refer you to.     PRINCIPAL DISCHARGE DIAGNOSIS  Diagnosis: Suspected infectious encephalitis  Assessment and Plan of Treatment: You came to the hospital because you were having significant headache, fevers, chills, and lack of coordination/balance. In the ED you became quite confused. You were evaluated and the team was concerned for encephalitis (an infection of the brain). To make this diagnosis, we performed a lumbar puncture to obtain spinal fluid. This fluid was sent for analysis and concerning for a viral encephalitis. You were treated with antibiotics and anti-viral medications to cover you for all causes of encephalitis while the work-up was being done. You tested negative for all viruses and bacteria we tested for. You improved clinically and the antibiotics/antivirals were discontinued. Your brain MRI showed non-specific white matter changes. This could have been caused by an infection of the brain or some other pathology. This can be followed up outpatient at the neurology clinic we will refer you to. Please follow-up with your neurologist on discharge. Please return to the ED if you experience fevers, chills, increased headache, confusion, sinus pain, weakness, or vision loss.

## 2024-04-09 NOTE — DIETITIAN INITIAL EVALUATION ADULT - ADD RECOMMEND
1.Monitor PO intake, Labs, weights, BMs, and skin integrity.   2.Please consistently document %PO intake in nursing flowsheets   3.Encourage small frequent PO intake as tolerated and honor food preferences  4.Consider adding bowel regimen

## 2024-04-09 NOTE — PROGRESS NOTE ADULT - PROBLEM SELECTOR PLAN 3
- , , ALT 75 with R factor 1.2, suggestive of cholestatic pattern  - RUQ US with cholelithiasis  - Likely DILI, potentially 2/2 ceftriaxone. Will CTM LFTs

## 2024-04-09 NOTE — DIETITIAN INITIAL EVALUATION ADULT - PERSON TAUGHT/METHOD
Discussed with patient importance of PO intake and prioritizing sources of protein to maintain lean body mass/verbal instruction/patient instructed

## 2024-04-09 NOTE — DIETITIAN INITIAL EVALUATION ADULT - NS FNS WEIGHT CHANGE REASON
Patient reports UBW ~175lbs with no timeframe. Current body weight 199lbs (4/7). Patient reports weight gain most likely r/t lack of activities. Weight trend is increased most likely r/t on IV fluids per [facility] weight history: 87.6kg (4/4)/unintentional

## 2024-04-09 NOTE — DIETITIAN INITIAL EVALUATION ADULT - OTHER INFO
Met patient at bedside. Patient alert, and reports feeling weak, tired and headache.  Patient reports poor appetite in house. Per RN flowsheets intake is 51-75% (4/9). As per patient, he eats fruits and avoiding solid food 2/2 nausea. Patient denies any recent diarrhea or constipation. Patient states his last BM was a week ago due to he eats little. Consider adding bowel regimen. No reported chewing/swallowing issues. No known food allergies. Medications notable for antibiotic. IV fluids noted for hydration. Patient amenable to provision of nutritional shake to optimize PO intake. Food preferences explored and noted, diet office informed. Discussed with patient importance of PO intake and prioritizing sources of protein to maintain lean body mass . RD to remain available for further nutritional interventions as indicated

## 2024-04-09 NOTE — DIETITIAN INITIAL EVALUATION ADULT - ORAL INTAKE PTA/DIET HISTORY
Patient reports poor appetite since last week PTA. States he can't tolerate solid food and even soup PTA. Prior use of vitamins noted PTA. Denies prior use of nutritional shakes PTA. Patient reports vomiting and nausea PTA. No other reported issues.

## 2024-04-09 NOTE — DIETITIAN INITIAL EVALUATION ADULT - PERTINENT LABORATORY DATA
04-09    144  |  107  |  12  ----------------------------<  137<H>  3.9   |  23  |  1.08    Ca    8.7      09 Apr 2024 06:55  Phos  4.0     04-09  Mg     2.20     04-09    TPro  6.3  /  Alb  3.2<L>  /  TBili  0.6  /  DBili  x   /  AST  71<H>  /  ALT  115<H>  /  AlkPhos  159<H>  04-09

## 2024-04-09 NOTE — DISCHARGE NOTE PROVIDER - NSFOLLOWUPCLINICS_GEN_ALL_ED_FT
[facility]  Neurology  [address] [place] [address] [address] [place] [address]  Phone: [phone]  Fax:

## 2024-04-09 NOTE — PROGRESS NOTE ADULT - TIME BILLING
Reviewing the chart, interpreting lab data, discussing case with team, interview and examination of patient, and documentation.
Chart review  Review of test results.  Obtaining a history and examining the patient.   Discussion with other care providers.  Discussing the diagnosis, prognosis and work up and treatment plan with the patient and family.
Time-based billing (NON-critical care).     More than 50% of the visit was spent counseling and / or coordinating care by the attending physician.      The necessity of the time spent during the encounter on this date of service was due to: documentation in [place], reviewing chart and coordinating care with patient/residents and interdisciplinary staff (such as , social workers, etc) as well as reviewing vitals, laboratory data, radiology, medication list, consultants' recommendations and prior records. Interventions were performed as documented above.

## 2024-04-10 LAB
ALBUMIN SERPL ELPH-MCNC: 3.2 G/DL — LOW (ref 3.3–5)
ALP SERPL-CCNC: 169 U/L — HIGH (ref 40–120)
ALT FLD-CCNC: 127 U/L — HIGH (ref 4–41)
ANION GAP SERPL CALC-SCNC: 12 MMOL/L — SIGNIFICANT CHANGE UP (ref 7–14)
AST SERPL-CCNC: 57 U/L — HIGH (ref 4–40)
B BURGDOR C6 AB SER-ACNC: NEGATIVE — SIGNIFICANT CHANGE UP
B BURGDOR IGG+IGM SER QL IB: SIGNIFICANT CHANGE UP
B BURGDOR IGG+IGM SER-ACNC: 0.03 INDEX — SIGNIFICANT CHANGE UP (ref 0.01–0.89)
BILIRUB SERPL-MCNC: 0.6 MG/DL — SIGNIFICANT CHANGE UP (ref 0.2–1.2)
BUN SERPL-MCNC: 11 MG/DL — SIGNIFICANT CHANGE UP (ref 7–23)
CALCIUM SERPL-MCNC: 8.8 MG/DL — SIGNIFICANT CHANGE UP (ref 8.4–10.5)
CHLORIDE SERPL-SCNC: 106 MMOL/L — SIGNIFICANT CHANGE UP (ref 98–107)
CO2 SERPL-SCNC: 23 MMOL/L — SIGNIFICANT CHANGE UP (ref 22–31)
CREAT SERPL-MCNC: 1.02 MG/DL — SIGNIFICANT CHANGE UP (ref 0.5–1.3)
EGFR: 91 ML/MIN/1.73M2 — SIGNIFICANT CHANGE UP
GLUCOSE SERPL-MCNC: 101 MG/DL — HIGH (ref 70–99)
HAV IGM SER-ACNC: SIGNIFICANT CHANGE UP
HBV CORE IGM SER-ACNC: SIGNIFICANT CHANGE UP
HBV SURFACE AB SER-ACNC: SIGNIFICANT CHANGE UP
HBV SURFACE AG SER-ACNC: SIGNIFICANT CHANGE UP
HCT VFR BLD CALC: 43.1 % — SIGNIFICANT CHANGE UP (ref 39–50)
HCV AB S/CO SERPL IA: 0.26 S/CO — SIGNIFICANT CHANGE UP (ref 0–0.99)
HCV AB SERPL-IMP: SIGNIFICANT CHANGE UP
HGB BLD-MCNC: 14 G/DL — SIGNIFICANT CHANGE UP (ref 13–17)
INR BLD: 1.19 RATIO — HIGH (ref 0.85–1.18)
MAGNESIUM SERPL-MCNC: 2.3 MG/DL — SIGNIFICANT CHANGE UP (ref 1.6–2.6)
MCHC RBC-ENTMCNC: 26.8 PG — LOW (ref 27–34)
MCHC RBC-ENTMCNC: 32.5 GM/DL — SIGNIFICANT CHANGE UP (ref 32–36)
MCV RBC AUTO: 82.6 FL — SIGNIFICANT CHANGE UP (ref 80–100)
NRBC # BLD: 0 /100 WBCS — SIGNIFICANT CHANGE UP (ref 0–0)
NRBC # FLD: 0 K/UL — SIGNIFICANT CHANGE UP (ref 0–0)
PHOSPHATE SERPL-MCNC: 3.7 MG/DL — SIGNIFICANT CHANGE UP (ref 2.5–4.5)
PLATELET # BLD AUTO: 311 K/UL — SIGNIFICANT CHANGE UP (ref 150–400)
POTASSIUM SERPL-MCNC: 3.8 MMOL/L — SIGNIFICANT CHANGE UP (ref 3.5–5.3)
POTASSIUM SERPL-SCNC: 3.8 MMOL/L — SIGNIFICANT CHANGE UP (ref 3.5–5.3)
PROT SERPL-MCNC: 6.6 G/DL — SIGNIFICANT CHANGE UP (ref 6–8.3)
PROTHROM AB SERPL-ACNC: 13.4 SEC — HIGH (ref 9.5–13)
RBC # BLD: 5.22 M/UL — SIGNIFICANT CHANGE UP (ref 4.2–5.8)
RBC # FLD: 14.7 % — HIGH (ref 10.3–14.5)
SODIUM SERPL-SCNC: 141 MMOL/L — SIGNIFICANT CHANGE UP (ref 135–145)
T PALLIDUM AB TITR SER: NEGATIVE — SIGNIFICANT CHANGE UP
WBC # BLD: 8.19 K/UL — SIGNIFICANT CHANGE UP (ref 3.8–10.5)
WBC # FLD AUTO: 8.19 K/UL — SIGNIFICANT CHANGE UP (ref 3.8–10.5)

## 2024-04-10 PROCEDURE — 99232 SBSQ HOSP IP/OBS MODERATE 35: CPT

## 2024-04-10 PROCEDURE — 99239 HOSP IP/OBS DSCHRG MGMT >30: CPT | Mod: GC

## 2024-04-10 RX ORDER — IBUPROFEN 200 MG
400 TABLET ORAL ONCE
Refills: 0 | Status: DISCONTINUED | OUTPATIENT
Start: 2024-04-10 | End: 2024-04-11

## 2024-04-10 RX ADMIN — Medication 650 MILLIGRAM(S): at 18:02

## 2024-04-10 RX ADMIN — Medication 650 MILLIGRAM(S): at 17:02

## 2024-04-10 NOTE — PROGRESS NOTE ADULT - SUBJECTIVE AND OBJECTIVE BOX
*******************************  Edward Sims, PGY-1  Internal Medicine  Contact via [employer] TEAMS    *******************************    PROGRESS NOTE:     Patient is a 48y old  Male who presents with a chief complaint of fever (09 Apr 2024 16:33)      INTERVAL EVENTS: No acute overnight events.     SUBJECTIVE: Patient seen and examined at bedside. This morning, the patient is comfortable and doing well. No acute complaints. Denies fevers, chills, N/V/D, chest pain, SOB, abdominal pain.    MEDICATIONS  (STANDING):  sodium chloride 0.9%. 1000 milliLiter(s) (100 mL/Hr) IV Continuous <Continuous>    MEDICATIONS  (PRN):  acetaminophen     Tablet .. 650 milliGRAM(s) Oral every 6 hours PRN Temp greater or equal to 38C (100.4F), Mild Pain (1 - 3)  aluminum hydroxide/magnesium hydroxide/simethicone Suspension 30 milliLiter(s) Oral every 4 hours PRN Dyspepsia  ondansetron Injectable 4 milliGRAM(s) IV Push every 8 hours PRN Nausea and/or Vomiting      CAPILLARY BLOOD GLUCOSE        I&O's Summary      PHYSICAL EXAM:  Vital Signs Last 24 Hrs  T(C): 37.1 (09 Apr 2024 22:00), Max: 37.7 (09 Apr 2024 18:30)  T(F): 98.8 (09 Apr 2024 22:00), Max: 99.8 (09 Apr 2024 18:30)  HR: 62 (09 Apr 2024 22:00) (62 - 96)  BP: 131/84 (09 Apr 2024 22:00) (128/92 - 131/88)  BP(mean): --  RR: 18 (09 Apr 2024 22:00) (18 - 19)  SpO2: 99% (09 Apr 2024 22:00) (97% - 100%)    Parameters below as of 09 Apr 2024 22:00  Patient On (Oxygen Delivery Method): room air        GENERAL: NAD, lying in bed comfortably  HEAD: Atraumatic, normocephalic  EYES: EOMI, PERRLA, conjunctiva and sclera clear  ENT: Moist mucous membranes  NECK: Supple, no JVD  HEART: S1, S2, Regular rate and rhythm, no murmurs, rubs, or gallops  LUNGS: Unlabored respirations, clear to auscultation bilaterally, no crackles, wheezing, or rhonchi  ABDOMEN: Soft, nontender, nondistended, +BS  EXTREMITIES: 2+ peripheral pulses bilaterally. No clubbing, cyanosis, or edema  NERVOUS SYSTEM:  A&Ox3, no focal deficits   SKIN: No rashes or lesions    LABS:                        13.9   7.80  )-----------( 221      ( 09 Apr 2024 06:55 )             41.8     04-09    144  |  107  |  12  ----------------------------<  137<H>  3.9   |  23  |  1.08    Ca    8.7      09 Apr 2024 06:55  Phos  4.0     04-09  Mg     2.20     04-09    TPro  6.3  /  Alb  3.2<L>  /  TBili  0.6  /  DBili  x   /  AST  71<H>  /  ALT  115<H>  /  AlkPhos  159<H>  04-09    PT/INR - ( 09 Apr 2024 06:55 )   PT: 13.3 sec;   INR: 1.19 ratio         PTT - ( 09 Apr 2024 06:55 )  PTT:32.0 sec      Urinalysis Basic - ( 09 Apr 2024 06:55 )    Color: x / Appearance: x / SG: x / pH: x  Gluc: 137 mg/dL / Ketone: x  / Bili: x / Urobili: x   Blood: x / Protein: x / Nitrite: x   Leuk Esterase: x / RBC: x / WBC x   Sq Epi: x / Non Sq Epi: x / Bacteria: x          RADIOLOGY & ADDITIONAL TESTS:  Results Reviewed:   Imaging Personally Reviewed:  Electrocardiogram Personally Reviewed:  Tele: *******************************  Edward Sims, PGY-1  Internal Medicine  Contact via [employer] TEAMS    *******************************    PROGRESS NOTE:     Patient is a 48y old  Male who presents with a chief complaint of fever (09 Apr 2024 16:33)      INTERVAL EVENTS: No acute overnight events.     SUBJECTIVE: Patient seen and examined at bedside. This morning, the patient is comfortable and doing well. No acute complaints. Denies fevers, chills, N/V/D, chest pain, SOB, abdominal pain.    MEDICATIONS  (STANDING):  sodium chloride 0.9%. 1000 milliLiter(s) (100 mL/Hr) IV Continuous <Continuous>    MEDICATIONS  (PRN):  acetaminophen     Tablet .. 650 milliGRAM(s) Oral every 6 hours PRN Temp greater or equal to 38C (100.4F), Mild Pain (1 - 3)  aluminum hydroxide/magnesium hydroxide/simethicone Suspension 30 milliLiter(s) Oral every 4 hours PRN Dyspepsia  ondansetron Injectable 4 milliGRAM(s) IV Push every 8 hours PRN Nausea and/or Vomiting      CAPILLARY BLOOD GLUCOSE        I&O's Summary      PHYSICAL EXAM:  Vital Signs Last 24 Hrs  T(C): 37.1 (09 Apr 2024 22:00), Max: 37.7 (09 Apr 2024 18:30)  T(F): 98.8 (09 Apr 2024 22:00), Max: 99.8 (09 Apr 2024 18:30)  HR: 62 (09 Apr 2024 22:00) (62 - 96)  BP: 131/84 (09 Apr 2024 22:00) (128/92 - 131/88)  BP(mean): --  RR: 18 (09 Apr 2024 22:00) (18 - 19)  SpO2: 99% (09 Apr 2024 22:00) (97% - 100%)    Parameters below as of 09 Apr 2024 22:00  Patient On (Oxygen Delivery Method): room air        GENERAL: NAD, lying in bed comfortably  HEAD: Atraumatic, normocephalic  EYES: EOMI, conjunctiva and sclera clear  ENT: Moist mucous membranes  NECK: Supple, no JVD  HEART: S1, S2, Regular rate and rhythm, no murmurs, rubs, or gallops  LUNGS: Unlabored respirations, clear to auscultation bilaterally, no crackles, wheezing, or rhonchi  ABDOMEN: Soft, nontender, nondistended  EXTREMITIES: No LE edema  NERVOUS SYSTEM:  A&Ox3, no focal deficits   SKIN: No rashes or lesions    LABS:                        13.9   7.80  )-----------( 221      ( 09 Apr 2024 06:55 )             41.8     04-09    144  |  107  |  12  ----------------------------<  137<H>  3.9   |  23  |  1.08    Ca    8.7      09 Apr 2024 06:55  Phos  4.0     04-09  Mg     2.20     04-09    TPro  6.3  /  Alb  3.2<L>  /  TBili  0.6  /  DBili  x   /  AST  71<H>  /  ALT  115<H>  /  AlkPhos  159<H>  04-09    PT/INR - ( 09 Apr 2024 06:55 )   PT: 13.3 sec;   INR: 1.19 ratio         PTT - ( 09 Apr 2024 06:55 )  PTT:32.0 sec      Urinalysis Basic - ( 09 Apr 2024 06:55 )    Color: x / Appearance: x / SG: x / pH: x  Gluc: 137 mg/dL / Ketone: x  / Bili: x / Urobili: x   Blood: x / Protein: x / Nitrite: x   Leuk Esterase: x / RBC: x / WBC x   Sq Epi: x / Non Sq Epi: x / Bacteria: x          RADIOLOGY & ADDITIONAL TESTS:  Results Reviewed:   Imaging Personally Reviewed:  Electrocardiogram Personally Reviewed:  Tele:

## 2024-04-10 NOTE — PROGRESS NOTE ADULT - PROBLEM SELECTOR PLAN 1
- Patient presenting with fever, AMS, focal neuro symptoms of ataxia, behavioral change suggestive of encephalitis. No meningeal sign on exam    - CSF studies with glucose 67, protein 64, WBC 58  with lymphotic predominance suggestive of viral etiology, less likely bacterial although could be early bacterial etiology      Plan:   - Serum encephalopathy work up ordered   - MRI brain w/wo IV contrast   - vEEG running  - Seizure and fall precautions   - Negative CSF w/u. Can dc acyclovir per ID  - Will f/u with neurology regarding further work-up - Patient presenting with fever, AMS, focal neuro symptoms of ataxia, behavioral change suggestive of encephalitis. No meningeal sign on exam    - CSF studies with glucose 67, protein 64, WBC 58  with lymphotic predominance suggestive of viral etiology, less likely bacterial although could be early bacterial etiology      Plan:   - Serum encephalopathy work up ordered and negative  - MRI brain w/wo IV contrast w/ white matter changes, non-specific  - vEEG running  - Seizure and fall precautions   - Negative CSF w/u. Not on antivirals or antibiotics  - Will f/u with neurology regarding further work-up

## 2024-04-10 NOTE — PROGRESS NOTE ADULT - ASSESSMENT
This is a 47 y/o M w/ PMHx of HLD who is presenting to [facility] on 4/6/24 for HA, vomiting, and unsteady gait. Pt states he has been feelign unwell since Monday, had pressure in his sinus. He went to  on Thursday, dx with sinusitis, recieved aumgnetin, however did not have any imrpovement. Started to have fevers, chills, unstable gait,     In the ER, pt was febrile to 100.9, other vitals stable. Labs w/o leukocytosis, transaminitis. U/A w/o signs of infection, COVID/Flu/RSV negative. CT angio brain/neck with mild cervical LAD.   Pt had LP in the ED, no opening pressure noted, Glucose 67, protein 64, nucleated cells 58, 49% lymphocytes, gram stain negative. Started on empiric Vancomycin, Ceftriaxone, Acyclovir.    #Encephalitis   #Fever   #VALDEZ    Overall, 47 y/o M w/ PMHx of HLD who is presenting w/ fevers, HA, gait difficulties, c/f encephalitis. LP with normal glucose, slightly elevated protein, with elevated nucleated cell count, lymphocytic. Pt forgetful, seems to have some change in personality on exam. Suspicious for HSV/VZV vs other aseptic encephalitis. Would continue with Acyclovir, can stop Vancomycin/Ceftriaxone.   F/u meningitis/encephalitis PCR.     CSF meningitis/encephalitis panel negative, however will request HSV1/2 PCR. Will send for syphilis testing (neurosyphillis?), cryptococcus serum Ag. Unknown etiology at this time.     HSV PCR now negative, no temporal findings on MRI, CSF profile w/ pleocytosis, minimal RBCs, minimally elevated protein. Picture is not consistent with HSV encephalitis. Syphilis negative, WNV IgM negative. Can stop acyclovir     Plan:   1. Can monitor off Acyclovir   2. Appreciate neurology recommendations, would follow up if any further testing.         Thank you for this consult. Inpatient ID team will follow.    Pradeep Venegas M.D.  Attending Physician  Division of Infectious Diseases  Department of Medicine

## 2024-04-10 NOTE — PROGRESS NOTE ADULT - SUBJECTIVE AND OBJECTIVE BOX
Infectious Diseases Follow Up:    Patient is a 48y old  Male who presents with a chief complaint of fever (10 Apr 2024 07:07)      Interval History/ROS:  No acute events ON. Afebrile ON.  Pt states he is feeling better, "brain fog" improved, is able to think quicker.     Allergies  No Known Allergies        ANTIMICROBIALS:      Current Abx:     Previous Abx     OTHER MEDS:  MEDICATIONS  (STANDING):  acetaminophen     Tablet .. 650 every 6 hours PRN  aluminum hydroxide/magnesium hydroxide/simethicone Suspension 30 every 4 hours PRN  ondansetron Injectable 4 every 8 hours PRN      Vital Signs Last 24 Hrs  T(C): 37.1 (09 Apr 2024 22:00), Max: 37.7 (09 Apr 2024 18:30)  T(F): 98.8 (09 Apr 2024 22:00), Max: 99.8 (09 Apr 2024 18:30)  HR: 62 (09 Apr 2024 22:00) (62 - 96)  BP: 131/84 (09 Apr 2024 22:00) (128/92 - 131/88)  BP(mean): --  RR: 18 (09 Apr 2024 22:00) (18 - 19)  SpO2: 99% (09 Apr 2024 22:00) (97% - 99%)    Parameters below as of 09 Apr 2024 22:00  Patient On (Oxygen Delivery Method): room air        PHYSICAL EXAM:  GENERAL: NAD, well-developed  HEAD:  Atraumatic, Normocephalic  EYES: EOMI, conjunctiva and sclera clear  CHEST/LUNG: On RA, not in respiratory distress  PSYCH: AAOx3                          14.0   8.19  )-----------( 311      ( 10 Apr 2024 08:21 )             43.1       04-10    141  |  106  |  11  ----------------------------<  101<H>  3.8   |  23  |  1.02    Ca    8.8      10 Apr 2024 08:21  Phos  3.7     04-10  Mg     2.30     04-10    TPro  6.6  /  Alb  3.2<L>  /  TBili  0.6  /  DBili  x   /  AST  57<H>  /  ALT  127<H>  /  AlkPhos  169<H>  04-10      Urinalysis Basic - ( 10 Apr 2024 08:21 )    Color: x / Appearance: x / SG: x / pH: x  Gluc: 101 mg/dL / Ketone: x  / Bili: x / Urobili: x   Blood: x / Protein: x / Nitrite: x   Leuk Esterase: x / RBC: x / WBC x   Sq Epi: x / Non Sq Epi: x / Bacteria: x        MICROBIOLOGY:  v  .CSF CSF  04-06-24   No growth to date.  --    polymorphonuclear leukocytes seen  No organisms seen  by cytocentrifuge      .Blood Blood-Peripheral  04-06-24   No growth at 72 Hours  --  --      .Blood Blood-Peripheral  04-06-24   No growth at 72 Hours  --  --          HSV 1/2 PCR: NotDetec (04-08 @ 12:30)  EBV PCR: NotDetec IU/mL (04-06 @ 21:32)        RADIOLOGY:

## 2024-04-11 ENCOUNTER — TRANSCRIPTION ENCOUNTER (OUTPATIENT)
Age: 49
End: 2024-04-11

## 2024-04-11 VITALS
RESPIRATION RATE: 18 BRPM | HEART RATE: 83 BPM | TEMPERATURE: 99 F | OXYGEN SATURATION: 98 % | DIASTOLIC BLOOD PRESSURE: 72 MMHG | SYSTOLIC BLOOD PRESSURE: 108 MMHG

## 2024-04-11 PROBLEM — E78.5 HYPERLIPIDEMIA, UNSPECIFIED: Chronic | Status: ACTIVE | Noted: 2024-04-06

## 2024-04-11 LAB
CULTURE RESULTS: SIGNIFICANT CHANGE UP
SPECIMEN SOURCE: SIGNIFICANT CHANGE UP

## 2024-04-11 PROCEDURE — 99232 SBSQ HOSP IP/OBS MODERATE 35: CPT | Mod: GC

## 2024-04-11 PROCEDURE — 99232 SBSQ HOSP IP/OBS MODERATE 35: CPT

## 2024-04-11 NOTE — PROGRESS NOTE ADULT - PROBLEM SELECTOR PROBLEM 1
Sepsis with encephalopathy

## 2024-04-11 NOTE — PROGRESS NOTE ADULT - PROBLEM SELECTOR PLAN 1
- Patient presenting with fever, AMS, focal neuro symptoms of ataxia, behavioral change suggestive of encephalitis. No meningeal sign on exam    - CSF studies with glucose 67, protein 64, WBC 58  with lymphotic predominance suggestive of viral etiology, less likely bacterial although could be early bacterial etiology      Plan:   - Serum encephalopathy work up ordered and negative  - MRI brain w/wo IV contrast w/ white matter changes, non-specific  - vEEG w/o seizures  - Seizure and fall precautions   - Negative CSF w/u. Not on antivirals or antibiotics  - Will f/u with neurology regarding further work-up

## 2024-04-11 NOTE — DISCHARGE NOTE NURSING/CASE MANAGEMENT/SOCIAL WORK - NSDCFUADDAPPT_GEN_ALL_CORE_FT
APPTS ARE READY TO BE MADE: [X] YES    Best Family or Patient Contact (if needed):    Additional Information about above appointments (if needed):    1: Please follow-up with your PCP in 2 weeks  2: Please follow-up with neurology in 1 week  3:     Other comments or requests:

## 2024-04-11 NOTE — PROGRESS NOTE ADULT - ASSESSMENT
This is a 47 y/o M w/ PMHx of HLD who is presenting to [facility] on 4/6/24 for HA, vomiting, and unsteady gait. Pt states he has been feelign unwell since Monday, had pressure in his sinus. He went to  on Thursday, dx with sinusitis, recieved aumgnetin, however did not have any imrpovement. Started to have fevers, chills, unstable gait,     In the ER, pt was febrile to 100.9, other vitals stable. Labs w/o leukocytosis, transaminitis. U/A w/o signs of infection, COVID/Flu/RSV negative. CT angio brain/neck with mild cervical LAD.   Pt had LP in the ED, no opening pressure noted, Glucose 67, protein 64, nucleated cells 58, 49% lymphocytes, gram stain negative. Started on empiric Vancomycin, Ceftriaxone, Acyclovir.    #Encephalitis   #Fever   #VALDEZ    Overall, 47 y/o M w/ PMHx of HLD who is presenting w/ fevers, HA, gait difficulties, c/f encephalitis. LP with normal glucose, slightly elevated protein, with elevated nucleated cell count, lymphocytic. Pt forgetful, seems to have some change in personality on exam. Suspicious for HSV/VZV vs other aseptic encephalitis. Would continue with Acyclovir, can stop Vancomycin/Ceftriaxone.   F/u meningitis/encephalitis PCR.     CSF meningitis/encephalitis panel negative, however will request HSV1/2 PCR. Will send for syphilis testing (neurosyphillis?), cryptococcus serum Ag. Unknown etiology at this time.     HSV PCR now negative, no temporal findings on MRI, CSF profile w/ pleocytosis, minimal RBCs, minimally elevated protein. Picture is not consistent with HSV encephalitis. Syphilis negative, WNV IgM negative. Can stop acyclovir   Lyme testing negative. Pt continues to improved off Acyclovir, possible viral etiology not identified on meningitis/encephalitis.     Plan:   1. Can monitor off Acyclovir   2. Appreciate neurology recommendations, would follow up if any further testing.    3. No ID contraindication to discharge       Thank you for this consult. Inpatient ID consult team will sign off.    Further changes in lab values, imaging studies, or clinical status will not be known to ID inpatient consultants unless specifically communicated by primary team.    Pradeep Venegas MD  Attending Physician  Division of Infectious Diseases  Department of Medicine    Please contact through MS Teams message.  Office: [phone] (after 5 PM or weekend)

## 2024-04-11 NOTE — PROGRESS NOTE ADULT - ATTENDING COMMENTS
Mr. Pate is a 49 yo man with encephalopathy, headache and focal neurological signs and symptoms with inflammatory CSF and non-enhancing diffuse confluent WM changes on MRI brain.  Clinically he has been improving over the past 1-2 days.   The differential diagnosis includes: infectious-viral; autoimmune; less likely metabolic.   I agree with work up and management as above.   D/W patient and multiple family members.   Thank you.
48M with history of HLD (not compliant with medications) who presents to the hospital with complaints of feeling unwell here found to have likely viral meningitis (CSF with elevated protein, elevated TNC with lymphocyte predominance, CSF gram stain negative), lower suspicion for bacterial meningitis as patient's CSF without significantly low glucose, no neutrophil predominance, and TNC/protein level not as high as expected for bacterial meningitis, early bacterial meningitis also low concern as patient's symptoms have been present for many days.     #viral meningitis vs encephalitis  #AMS  - ID recs appreciated  - continue vanc, ceftriaxone, acyclovir while pending CSF PCR  - will continue dexamethasone until bacterial cause ruled out enough that we d/c antibiotics  - f/u MR head  - f/u EEG  - f/u BCx, CSF Cx    #transaminitis  - f/u RUQ u/s  - f/u acute hep panel
48-year-old male PMH of HLD (currently not on medication) presenting with fever, AMS, and focal neuro symptoms with behavioral change, overall concerning for sepsis in the setting of CNS infection, likely encephalitis vs meningitis, ruled out.  Patient is now stable for DC with same orders like yesterday.
48-year-old male PMH of HLD (currently not on medication) presenting with complaints of headache, vomiting, and unsteady gait. Patient states that he did not feel well since Monday, reported pressure in his sinuses He went to [facility] on Thursday where he was diagnosed with sinusitis and was prescribed augmentin. He took the medication that night and the next morning, without any relief and was experiencing fever and chills so he came to the ED today. Family also noted he was having unstable gait holding onto objects and wall to navigate which is off from his baseline.     He presented to [facility] ED for the above. Over the ED course, he started having change in mental status, became more disoriented with trouble concentrating and attention. He was also found to be ataxic, having difficulty coordinating movements. Febrile to 100.7. He underwent LP and was given vanco, ceftriaxone, and acyclovir for empiric treatment of meningitis.   ID, Neurology consulted, f/w recs    Suspicious for HSV/VZV vs other aseptic encephalitis. HSV negative in CHF, DC  Acyclovir, can stop Vancomycin/Ceftriaxone.   F/u meningitis/encephalitis PCR.       EEG  no seizures   adding  CSF and serum autoimmune panel, CSF OCB, NMO, MOG, MBP, flow cytometry, not enough CHF, will check serum levels   Correct electrolytes PRN  DVT px .
48-year-old male PMH of HLD (currently not on medication) presenting with complaints of headache, vomiting, and unsteady gait. Patient states that he did not feel well since Monday, reported pressure in his sinuses He went to [facility] on Thursday where he was diagnosed with sinusitis and was prescribed augmentin. He took the medication that night and the next morning, without any relief and was experiencing fever and chills so he came to the ED today. Family also noted he was having unstable gait holding onto objects and wall to navigate which is off from his baseline.     He presented to [facility] ED for the above. Over the ED course, he started having change in mental status, became more disoriented with trouble concentrating and attention. He was also found to be ataxic, having difficulty coordinating movements. Febrile to 100.7. He underwent LP and was given vanco, ceftriaxone, and acyclovir for empiric treatment of meningitis.   ID, Neurology consulted.   Suspicious for HSV/VZV vs other aseptic encephalitis. Would continue with Acyclovir, can stop Vancomycin/Ceftriaxone.   F/u meningitis/encephalitis PCR.     CSF meningitis/encephalitis panel negative, however will request HSV1/2 PCR. Will send for syphilis testing (neurosyphillis?), cryptococcus serum Ag. Unknown etiology at this time.     EEG on , will f/w official report   adding  CSF and serum autoimmune panel, CSF OCB, NMO, MOG, MBP, flow cytometry   Correct electrolytes PRN  DVT px

## 2024-04-11 NOTE — DISCHARGE NOTE NURSING/CASE MANAGEMENT/SOCIAL WORK - PATIENT PORTAL LINK FT
You can access the FollowMyHealth Patient Portal offered by [facility] by registering at the following website: [email or web address]. By joining [facility]’s FollowMyHealth portal, you will also be able to view your health information using other applications (apps) compatible with our system.

## 2024-04-11 NOTE — PROGRESS NOTE ADULT - SUBJECTIVE AND OBJECTIVE BOX
*******************************  Edward Sims, PGY-1  Internal Medicine  Contact via [employer] TEAMS    *******************************    PROGRESS NOTE:     Patient is a 48y old  Male who presents with a chief complaint of fever (10 Apr 2024 10:43)      INTERVAL EVENTS: No acute overnight events.     SUBJECTIVE: Patient seen and examined at bedside. This morning, the patient is comfortable and doing well. No acute complaints. Denies fevers, chills, N/V/D, chest pain, SOB, abdominal pain.    MEDICATIONS  (STANDING):  ibuprofen  Tablet. 400 milliGRAM(s) Oral once    MEDICATIONS  (PRN):  acetaminophen     Tablet .. 650 milliGRAM(s) Oral every 6 hours PRN Temp greater or equal to 38C (100.4F), Mild Pain (1 - 3)  aluminum hydroxide/magnesium hydroxide/simethicone Suspension 30 milliLiter(s) Oral every 4 hours PRN Dyspepsia  ondansetron Injectable 4 milliGRAM(s) IV Push every 8 hours PRN Nausea and/or Vomiting      CAPILLARY BLOOD GLUCOSE        I&O's Summary      PHYSICAL EXAM:  Vital Signs Last 24 Hrs  T(C): 36.8 (11 Apr 2024 04:32), Max: 37 (10 Apr 2024 10:30)  T(F): 98.3 (11 Apr 2024 04:32), Max: 98.6 (10 Apr 2024 10:30)  HR: 75 (11 Apr 2024 04:32) (75 - 106)  BP: 107/77 (11 Apr 2024 04:32) (107/77 - 131/103)  BP(mean): --  RR: 18 (11 Apr 2024 04:32) (18 - 18)  SpO2: 98% (11 Apr 2024 04:32) (97% - 100%)    Parameters below as of 11 Apr 2024 04:32  Patient On (Oxygen Delivery Method): room air        GENERAL: NAD, lying in bed comfortably  HEAD: Atraumatic, normocephalic  EYES: EOMI, PERRLA, conjunctiva and sclera clear  ENT: Moist mucous membranes  NECK: Supple, no JVD  HEART: S1, S2, Regular rate and rhythm, no murmurs, rubs, or gallops  LUNGS: Unlabored respirations, clear to auscultation bilaterally, no crackles, wheezing, or rhonchi  ABDOMEN: Soft, nontender, nondistended, +BS  EXTREMITIES: 2+ peripheral pulses bilaterally. No clubbing, cyanosis, or edema  NERVOUS SYSTEM:  A&Ox3, no focal deficits   SKIN: No rashes or lesions    LABS:                        14.0   8.19  )-----------( 311      ( 10 Apr 2024 08:21 )             43.1     04-10    141  |  106  |  11  ----------------------------<  101<H>  3.8   |  23  |  1.02    Ca    8.8      10 Apr 2024 08:21  Phos  3.7     04-10  Mg     2.30     04-10    TPro  6.6  /  Alb  3.2<L>  /  TBili  0.6  /  DBili  x   /  AST  57<H>  /  ALT  127<H>  /  AlkPhos  169<H>  04-10    PT/INR - ( 10 Apr 2024 08:21 )   PT: 13.4 sec;   INR: 1.19 ratio               Urinalysis Basic - ( 10 Apr 2024 08:21 )    Color: x / Appearance: x / SG: x / pH: x  Gluc: 101 mg/dL / Ketone: x  / Bili: x / Urobili: x   Blood: x / Protein: x / Nitrite: x   Leuk Esterase: x / RBC: x / WBC x   Sq Epi: x / Non Sq Epi: x / Bacteria: x          RADIOLOGY & ADDITIONAL TESTS:  Results Reviewed:   Imaging Personally Reviewed:  Electrocardiogram Personally Reviewed:  Tele: *******************************  Edward Sims, PGY-1  Internal Medicine  Contact via [employer] TEAMS    *******************************    PROGRESS NOTE:     Patient is a 48y old  Male who presents with a chief complaint of fever (10 Apr 2024 10:43)      INTERVAL EVENTS: No acute overnight events.     SUBJECTIVE: Patient seen and examined at bedside. This morning, the patient is comfortable and doing well. No acute complaints. Denies fevers, chills, N/V/D, chest pain, SOB, abdominal pain. His headache has now resolved.    MEDICATIONS  (STANDING):  ibuprofen  Tablet. 400 milliGRAM(s) Oral once    MEDICATIONS  (PRN):  acetaminophen     Tablet .. 650 milliGRAM(s) Oral every 6 hours PRN Temp greater or equal to 38C (100.4F), Mild Pain (1 - 3)  aluminum hydroxide/magnesium hydroxide/simethicone Suspension 30 milliLiter(s) Oral every 4 hours PRN Dyspepsia  ondansetron Injectable 4 milliGRAM(s) IV Push every 8 hours PRN Nausea and/or Vomiting      CAPILLARY BLOOD GLUCOSE        I&O's Summary      PHYSICAL EXAM:  Vital Signs Last 24 Hrs  T(C): 36.8 (11 Apr 2024 04:32), Max: 37 (10 Apr 2024 10:30)  T(F): 98.3 (11 Apr 2024 04:32), Max: 98.6 (10 Apr 2024 10:30)  HR: 75 (11 Apr 2024 04:32) (75 - 106)  BP: 107/77 (11 Apr 2024 04:32) (107/77 - 131/103)  BP(mean): --  RR: 18 (11 Apr 2024 04:32) (18 - 18)  SpO2: 98% (11 Apr 2024 04:32) (97% - 100%)    Parameters below as of 11 Apr 2024 04:32  Patient On (Oxygen Delivery Method): room air        GENERAL: NAD, lying in bed comfortably  HEAD: Atraumatic, normocephalic  EYES: EOMI, conjunctiva and sclera clear  ENT: Moist mucous membranes  NECK: Supple, no JVD  HEART: S1, S2, Regular rate and rhythm, no murmurs, rubs, or gallops  LUNGS: Unlabored respirations, clear to auscultation bilaterally, no crackles, wheezing, or rhonchi  ABDOMEN: Soft, nontender, nondistended  EXTREMITIES: No LE edema  NERVOUS SYSTEM:  A&Ox3, no focal deficits   SKIN: No rashes or lesions    LABS:                        14.0   8.19  )-----------( 311      ( 10 Apr 2024 08:21 )             43.1     04-10    141  |  106  |  11  ----------------------------<  101<H>  3.8   |  23  |  1.02    Ca    8.8      10 Apr 2024 08:21  Phos  3.7     04-10  Mg     2.30     04-10    TPro  6.6  /  Alb  3.2<L>  /  TBili  0.6  /  DBili  x   /  AST  57<H>  /  ALT  127<H>  /  AlkPhos  169<H>  04-10    PT/INR - ( 10 Apr 2024 08:21 )   PT: 13.4 sec;   INR: 1.19 ratio               Urinalysis Basic - ( 10 Apr 2024 08:21 )    Color: x / Appearance: x / SG: x / pH: x  Gluc: 101 mg/dL / Ketone: x  / Bili: x / Urobili: x   Blood: x / Protein: x / Nitrite: x   Leuk Esterase: x / RBC: x / WBC x   Sq Epi: x / Non Sq Epi: x / Bacteria: x          RADIOLOGY & ADDITIONAL TESTS:  Results Reviewed:   Imaging Personally Reviewed:  Electrocardiogram Personally Reviewed:  Tele:

## 2024-04-11 NOTE — DISCHARGE NOTE NURSING/CASE MANAGEMENT/SOCIAL WORK - NSDCPEFALRISK_GEN_ALL_CORE
For information on Fall & Injury Prevention, visit: [email or web address] OR  [email or web address] OR  https://www.cdc.gov/steadi/patient.html

## 2024-04-11 NOTE — PROGRESS NOTE ADULT - SUBJECTIVE AND OBJECTIVE BOX
Infectious Diseases Follow Up:    Patient is a 48y old  Male who presents with a chief complaint of fever (11 Apr 2024 07:02)      Interval History/ROS:  Afebrile ON, had some sinus pain yesterday, improved with Tylenol.   Pt reports improvement with "brain fog"    Allergies  No Known Allergies        ANTIMICROBIALS:      Current Abx:     Previous Abx     OTHER MEDS:  MEDICATIONS  (STANDING):  acetaminophen     Tablet .. 650 every 6 hours PRN  aluminum hydroxide/magnesium hydroxide/simethicone Suspension 30 every 4 hours PRN  ibuprofen  Tablet. 400 once  ondansetron Injectable 4 every 8 hours PRN      Vital Signs Last 24 Hrs  T(C): 36.8 (11 Apr 2024 04:32), Max: 37 (10 Apr 2024 13:19)  T(F): 98.3 (11 Apr 2024 04:32), Max: 98.6 (10 Apr 2024 13:19)  HR: 75 (11 Apr 2024 04:32) (75 - 106)  BP: 107/77 (11 Apr 2024 04:32) (107/77 - 131/94)  BP(mean): --  RR: 18 (11 Apr 2024 04:32) (18 - 18)  SpO2: 98% (11 Apr 2024 04:32) (97% - 100%)    Parameters below as of 11 Apr 2024 04:32  Patient On (Oxygen Delivery Method): room air      PHYSICAL EXAM:  GENERAL: NAD, well-developed  HEAD:  Atraumatic, Normocephalic  EYES: EOMI, conjunctiva and sclera clear  CHEST/LUNG: On RA, not in respiratory distress  PSYCH: AAOx3                                   14.0   8.19  )-----------( 311      ( 10 Apr 2024 08:21 )             43.1       04-10    141  |  106  |  11  ----------------------------<  101<H>  3.8   |  23  |  1.02    Ca    8.8      10 Apr 2024 08:21  Phos  3.7     04-10  Mg     2.30     04-10    TPro  6.6  /  Alb  3.2<L>  /  TBili  0.6  /  DBili  x   /  AST  57<H>  /  ALT  127<H>  /  AlkPhos  169<H>  04-10      Urinalysis Basic - ( 10 Apr 2024 08:21 )    Color: x / Appearance: x / SG: x / pH: x  Gluc: 101 mg/dL / Ketone: x  / Bili: x / Urobili: x   Blood: x / Protein: x / Nitrite: x   Leuk Esterase: x / RBC: x / WBC x   Sq Epi: x / Non Sq Epi: x / Bacteria: x        MICROBIOLOGY:  v  .CSF CSF  04-06-24   No growth to date.  --    polymorphonuclear leukocytes seen  No organisms seen  by cytocentrifuge      .Blood Blood-Peripheral  04-06-24   No growth at 4 days  --  --      .Blood Blood-Peripheral  04-06-24   No growth at 4 days  --  --          HSV 1/2 PCR: NotDetec (04-08 @ 12:30)  EBV PCR: NotDetec IU/mL (04-06 @ 21:32)        RADIOLOGY:

## 2024-04-11 NOTE — PROGRESS NOTE ADULT - PROVIDER SPECIALTY LIST ADULT
Infectious Disease
Internal Medicine
Infectious Disease
Neurology
Internal Medicine

## 2024-04-11 NOTE — PROGRESS NOTE ADULT - PROBLEM SELECTOR PROBLEM 3
Elevated liver transaminase level

## 2024-04-12 LAB
CULTURE RESULTS: SIGNIFICANT CHANGE UP
CULTURE RESULTS: SIGNIFICANT CHANGE UP
SPECIMEN SOURCE: SIGNIFICANT CHANGE UP
SPECIMEN SOURCE: SIGNIFICANT CHANGE UP

## 2024-04-15 LAB
AMPHIPHYSIN AB TITR CSF: NEGATIVE — SIGNIFICANT CHANGE UP
CV2 IGG TITR CSF: NEGATIVE — SIGNIFICANT CHANGE UP
GLIAL NUC TYPE 1 AB TITR CSF: NEGATIVE — SIGNIFICANT CHANGE UP
HU1 AB TITR CSF IF: NEGATIVE — SIGNIFICANT CHANGE UP
HU2 AB TITR CSF IF: NEGATIVE — SIGNIFICANT CHANGE UP
HU3 AB TITR CSF: NEGATIVE — SIGNIFICANT CHANGE UP
IFA NOTES: SIGNIFICANT CHANGE UP
LYME IGG LINE BLOT INTERP.: NEGATIVE — SIGNIFICANT CHANGE UP
LYME IGM LINE BLOT INTERP.: NEGATIVE — SIGNIFICANT CHANGE UP
P18 AB. IGG: SIGNIFICANT CHANGE UP
P23 AB. IGG: SIGNIFICANT CHANGE UP
P23 AB. IGM: SIGNIFICANT CHANGE UP
P28 AB. IGG: SIGNIFICANT CHANGE UP
P30 AB. IGG: SIGNIFICANT CHANGE UP
P39 AB. IGG: SIGNIFICANT CHANGE UP
P39 AB. IGM: SIGNIFICANT CHANGE UP
P41 AB. IGG: SIGNIFICANT CHANGE UP
P41 AB. IGM: SIGNIFICANT CHANGE UP
P45 AB. IGG: SIGNIFICANT CHANGE UP
P58 AB. IGG: SIGNIFICANT CHANGE UP
P66 AB. IGG: SIGNIFICANT CHANGE UP
P93 AB. IGG: SIGNIFICANT CHANGE UP
PARANEOPLASTIC INTERPRETATION, CSF: SIGNIFICANT CHANGE UP
PCA-TR AB TITR CSF: NEGATIVE — SIGNIFICANT CHANGE UP
PURKINJE CELL CYTOPLASMIC AB TYPE 2: NEGATIVE — SIGNIFICANT CHANGE UP
PURKINJE CELLS AB TITR CSF IF: NEGATIVE — SIGNIFICANT CHANGE UP

## 2024-04-17 LAB — AQP4 H2O CHANNEL AB SERPL IA-ACNC: NEGATIVE — SIGNIFICANT CHANGE UP

## 2024-04-19 LAB — MOG AB SER QL CBA IFA: NEGATIVE — SIGNIFICANT CHANGE UP

## 2024-04-25 ENCOUNTER — OUTPATIENT (OUTPATIENT)
Dept: OUTPATIENT SERVICES | Facility: HOSPITAL | Age: 49
LOS: 1 days | End: 2024-04-25

## 2024-04-25 ENCOUNTER — APPOINTMENT (OUTPATIENT)
Dept: NEUROLOGY | Facility: HOSPITAL | Age: 49
End: 2024-04-25

## 2024-04-25 VITALS
RESPIRATION RATE: 16 BRPM | HEIGHT: 68 IN | OXYGEN SATURATION: 98 % | SYSTOLIC BLOOD PRESSURE: 128 MMHG | TEMPERATURE: 37 F | WEIGHT: 180 LBS | HEART RATE: 66 BPM | BODY MASS INDEX: 27.28 KG/M2 | DIASTOLIC BLOOD PRESSURE: 87 MMHG

## 2024-04-25 DIAGNOSIS — H57.89 OTHER SPECIFIED DISORDERS OF EYE AND ADNEXA: ICD-10-CM

## 2024-04-25 DIAGNOSIS — R90.82 WHITE MATTER DISEASE, UNSPECIFIED: ICD-10-CM

## 2024-04-25 DIAGNOSIS — R56.9 UNSPECIFIED CONVULSIONS: ICD-10-CM

## 2024-04-25 PROCEDURE — G0463: CPT

## 2024-04-25 RX ORDER — ROSUVASTATIN CALCIUM 10 MG/1
10 TABLET, FILM COATED ORAL
Refills: 0 | Status: ACTIVE | COMMUNITY
Start: 2024-04-25

## 2024-04-25 NOTE — DISCUSSION/SUMMARY
[FreeTextEntry1] : 48 y.o. M with PMH HLD and recent meningoencephalitis presented to [facility] neurology clinic for a follow up after recent hospitalization at [facility]. Discharged 4/11, improvement of symptoms noted. Intermittnetly having L sided neck pain radiation to L side of the head, without visual, autonomic, tinnitus (including pulsitile), focal neurologic symptoms. Lacks good sleeping pattern since hospitalization. Current neuro exam non-focal. MRI brain w/wo at hospital showed severe white matter disease. CSF studies showed WBC 58, but otherwise normal including negative paraneoplastic/autoimmune, NMO, MOG, lyme, cyphilis, CSF panel/culture.  Impression: Chronic white matter disease of unclear etiology. No reported drug use. R/o progression of white matter disease from demyelinating vs lower suspicion for leukodystrophy  Plan: [] MRI brain w/wo and cervical w/wo [] If any abnormalities in the brain concerning for leukodystrophy, will conisder leukodystrophy work up [] Ophtho (L red eye), infectious disease (post encephalitis follow up) referral  [] RTC 3 months or sooner   Case seen and discussed with general neurology attending Dr. Jesus

## 2024-04-25 NOTE — END OF VISIT
[] : Resident [FreeTextEntry3] : [id number] man with HLD admitted to [facility] for headache. MRI showed white matter changes. Clinical improved after DC. Exam today is non-focal. #white matter changes DDx is broad. leukodystrophy vs demyelinating disease vs small vessels. Will repeat MRI within 1-2 months brain and C-spine.  [Time Spent: ___ minutes] : I have spent [unfilled] minutes of time on the encounter.

## 2024-04-25 NOTE — PHYSICAL EXAM
[FreeTextEntry1] : MS: Eyes open. Responds to verbal stimuli. Normal speech, comprehension and repetition intact CN: PERRL (3mm b/l), EOMI, VFF, No facial asymmetry. V1-3 sensation intact. Tongue midline without atrophy. Motor: 5/5 strength thorughout b/l biceps, triceps, hip flexion, knee extension, knee flexion, dorsi/plantar flexion Sensation: Intact to LT thorughout Reflex: 2+ throughout b/l biceps, BR, patellar, achilles. Toes down going Coordination: No FTN dysmetria b/l Gait: Normal

## 2024-04-25 NOTE — HISTORY OF PRESENT ILLNESS
[FreeTextEntry1] : 48 y.o. M with PMH HLD and recent meningoencephalitis presented to [facility] neurology clinic for a follow up after recent hospitalization at [facility]. Early April, pt was having severe headache for 4 days. Initially went to [facility], diagnosed with sinusitis and was sent back home. Couple days later, went to [facility] due to onset of double vision for a day. Underwent lumbar puncture, CSF WBC showed 58, and was treated with HSV/VZV with acyclovir, ceftriaxone, and vancomycin. Was discharged from hospital 4/11/24, and has been at home for over a week. He has been noting improvement in strength, but has been having poor sleep due to interruption of phone call. He occasionally has L sided neck pain that radiates to the L side of the head, without any visual deficits, numbness/tingling/weakness, or any nausea/vomiting/photophobia. He reported 2 years prior to presentation, he had one sided headache that would improve with nap, at most 2-3 days, but intermittent in nature, go away with nap in a quiet space. Smokes cigarettes never used illicit drug. Never diagnosed with high blood pressure or high sugar. [place] in [place] No FMH of neurologic disease. No childhood hx of neurologic diseases.

## 2024-05-26 ENCOUNTER — APPOINTMENT (OUTPATIENT)
Dept: MRI IMAGING | Facility: IMAGING CENTER | Age: 49
End: 2024-05-26
Payer: MEDICAID

## 2024-05-26 ENCOUNTER — OUTPATIENT (OUTPATIENT)
Dept: OUTPATIENT SERVICES | Facility: HOSPITAL | Age: 49
LOS: 1 days | End: 2024-05-26
Payer: MEDICAID

## 2024-05-26 DIAGNOSIS — R90.82 WHITE MATTER DISEASE, UNSPECIFIED: ICD-10-CM

## 2024-05-26 PROCEDURE — 70553 MRI BRAIN STEM W/O & W/DYE: CPT

## 2024-05-26 PROCEDURE — 72156 MRI NECK SPINE W/O & W/DYE: CPT | Mod: 26

## 2024-05-26 PROCEDURE — 70553 MRI BRAIN STEM W/O & W/DYE: CPT | Mod: 26

## 2024-05-26 PROCEDURE — 72156 MRI NECK SPINE W/O & W/DYE: CPT

## 2024-06-05 ENCOUNTER — NON-APPOINTMENT (OUTPATIENT)
Age: 49
End: 2024-06-05

## 2024-08-22 ENCOUNTER — OUTPATIENT (OUTPATIENT)
Dept: OUTPATIENT SERVICES | Facility: HOSPITAL | Age: 49
LOS: 1 days | End: 2024-08-22
Payer: MEDICAID

## 2024-08-22 ENCOUNTER — APPOINTMENT (OUTPATIENT)
Dept: NEUROLOGY | Facility: HOSPITAL | Age: 49
End: 2024-08-22

## 2024-08-22 VITALS
WEIGHT: 195 LBS | TEMPERATURE: 97.6 F | DIASTOLIC BLOOD PRESSURE: 84 MMHG | OXYGEN SATURATION: 97 % | HEIGHT: 69 IN | HEART RATE: 90 BPM | RESPIRATION RATE: 18 BRPM | BODY MASS INDEX: 28.88 KG/M2 | SYSTOLIC BLOOD PRESSURE: 122 MMHG

## 2024-08-22 DIAGNOSIS — H53.8 OTHER VISUAL DISTURBANCES: ICD-10-CM

## 2024-08-22 DIAGNOSIS — R51.9 HEADACHE, UNSPECIFIED: ICD-10-CM

## 2024-08-22 PROCEDURE — G0463: CPT

## 2024-08-22 NOTE — HISTORY OF PRESENT ILLNESS
[FreeTextEntry1] : ***Follow up 8/22/24**** Interim events: MR brain + c-spine w/wo performed. Occasional headaches which are rapid improvement with OTC medications. Now occurs roughly 1 every 1-2 weeks. Occurs in the same area as before, not on one side vs other.  Reports decreased sleep secondary to work, and also sleeping in environments not conducive to sleep. Reports redness in eye resolved, has not seen an eye doctor yet.  Reports blurred vision in both eyes when looking close up, two years ago, possibly getting a little worse. Denies double vision, denies changes in color vision.  ****Initial encounter - 4/25/24**** 48 year-old R handed man with PMH HLD and recent meningoencephalitis presented to [facility] neurology clinic for a follow up after recent hospitalization at [facility]. Early April, pt was having severe headache for 4 days. Initially went to [facility], diagnosed with sinusitis and was sent back home. Couple days later, went to [facility] due to onset of double vision for a day. Underwent lumbar puncture, CSF WBC showed 58, and was treated with HSV/VZV with acyclovir, ceftriaxone, and vancomycin. Was discharged from hospital 4/11/24, and has been at home for over a week. He has been noting improvement in strength, but has been having poor sleep due to interruption of phone call. He occasionally has L sided neck pain that radiates to the L side of the head, without any visual deficits, numbness/tingling/weakness, or any nausea/vomiting/photophobia. He reported 2 years prior to presentation, he had one sided headache that would improve with nap, at most 2-3 days, but intermittent in nature, go away with nap in a quiet space. Smokes cigarettes never used illicit drug. Never diagnosed with high blood pressure or high sugar. [place] in ethnicity. Born in [place]. No FMH of neurologic disease. No childhood hx of neurologic diseases.  Prior diagnostic workup: 4/8/24 28 hr EEG EEG Summary / Classification: Normal EEG in the awake , drowsy and asleep states. EEG Impression / Clinical Correlate: No epileptic discharges recorded. No seizures recorded.  MRI brain w/w/o IV contrast 5//26/2024:  Marked improvement of previously seen severe patchy confluent T2/FLAIR bihemispheric white matter T2/FLAIR hyperintense signal changes when compared with 4/7/2024 with very mild residual vague patchy confluent signal changes remaining. No abnormal intracranial enhancement.  MR cervical spine w/w/o IV contrast 5/26/2024: Cervical spinal cord is normal in signal intensity without evidence of abnormal enhancement.  LP during 4/2024 hospitalization: 64 protein, 67 glucose, 58 WBC with 49% lymphocytes and 43% monocytes. Neg CSF PCR, HIV, gram stain. Lyme negative, WNV -, ACE, CSF IgG Index, cryptococcal (-), paraneoplastic (-)

## 2024-08-22 NOTE — PHYSICAL EXAM
[FreeTextEntry1] : MS: Eyes open. Responds to verbal stimuli. Normal speech, comprehension and repetition intact CN: PERRL (3mm b/l), EOMI, VFF, No facial asymmetry. V1-3 sensation intact. Tongue midline without atrophy. Motor: 5/5 strength thorughout b/l biceps, triceps, hip flexion, knee extension, knee flexion, dorsi/plantar flexion Sensation: Intact to LT thorughout Reflex: 2+ throughout b/l biceps, BR, 1+ patellar, achilles. Toes down going Coordination: No FTN dysmetria b/l, HTS intact Gait: Normal gait

## 2024-08-22 NOTE — DISCUSSION/SUMMARY
[FreeTextEntry1] : 48 y.o. M with PMH HLD and recent meningoencephalitis presented to [facility] neurology clinic for a follow up after recent hospitalization at [facility]. Discharged 4/11, improvement of symptoms noted. Intermittnetly having L sided neck pain radiation to L side of the head, without visual, autonomic, tinnitus (including pulsitile), focal neurologic symptoms. Lacks good sleeping pattern since hospitalization. Current neuro exam non-focal. MRI brain w/wo at hospital showed severe white matter disease. CSF studies showed WBC 58, but otherwise normal including negative paraneoplastic/autoimmune, NMO, MOG, lyme, cyphilis, CSF panel/culture. Repeat MR brain w/w/o + MR c-spine w/w/o 5/2024 -> no cervical cord enhancement, MR brain with improvement of white matter changes.  Impression: Chronic white matter disease of unclear etiology. No reported drug use. R/o progression of white matter disease from demyelinating vs lower suspicion for leukodystrophy.   Plan: [x] MRI brain w/wo and cervical w/wo -> performed 5/2024; improved white matter disease, c-spine negative [] neuroophthalmology referral for blurred vision [] follow up in neuroimmunology clinic during next visit [] sleep hygiene discussed

## 2024-09-27 ENCOUNTER — APPOINTMENT (OUTPATIENT)
Dept: NEUROLOGY | Facility: HOSPITAL | Age: 49
End: 2024-09-27
Payer: MEDICAID

## 2024-09-27 ENCOUNTER — OUTPATIENT (OUTPATIENT)
Dept: OUTPATIENT SERVICES | Facility: HOSPITAL | Age: 49
LOS: 1 days | End: 2024-09-27
Payer: MEDICAID

## 2024-09-27 VITALS
OXYGEN SATURATION: 97 % | HEIGHT: 69 IN | RESPIRATION RATE: 16 BRPM | TEMPERATURE: 97 F | DIASTOLIC BLOOD PRESSURE: 96 MMHG | SYSTOLIC BLOOD PRESSURE: 134 MMHG | BODY MASS INDEX: 28.88 KG/M2 | HEART RATE: 80 BPM | WEIGHT: 195 LBS

## 2024-09-27 DIAGNOSIS — R90.82 WHITE MATTER DISEASE, UNSPECIFIED: ICD-10-CM

## 2024-09-27 DIAGNOSIS — R51.9 HEADACHE, UNSPECIFIED: ICD-10-CM

## 2024-09-27 PROCEDURE — G0463: CPT

## 2024-09-27 PROCEDURE — 99214 OFFICE O/P EST MOD 30 MIN: CPT

## 2024-09-27 NOTE — ASSESSMENT
[FreeTextEntry1] : Impression: Possible ADEM in setting of previous encephalitis. Headaches now improving. Confusion improved. MRI brain and c spine were reviewed. Brain shows significant improvement in his white matter changes.  Plan: - Follow up in 3 months - MRI with and without Brain repeat at 3 months visit.  - Neurooptho referral  Case seen with and discussed with Dr. Lorenzo

## 2024-09-27 NOTE — END OF VISIT
[] : Resident [FreeTextEntry3] : I personally reviewed medical records and MRI imaging.  Pt has been doing very well and denies any frequent headaches.  He is working full time.  Repeat imaging in May 2024 showed significant improvement in diffuse subcortical WM lesions. Serum MOG and NMO ab negative 4/2024.  CSF 4/2024 with TNC 58 and protein 63, rest of CSF work up was unremarkable, infectious work up negative.   Imp: post infectious immune mediated encephalopathy/ADEM- now resolved.  Repeat MRI brain w/wo contrast in 3 months for radiological stability.  [Time Spent: ___ minutes] : I have spent [unfilled] minutes of time on the encounter which excludes teaching and separately reported services.

## 2024-09-27 NOTE — DISCUSSION/SUMMARY
[FreeTextEntry1] : 48 y.o. M with PMH HLD and recent meningoencephalitis presented to [facility] neurology clinic for a follow up after recent hospitalization at [facility]. Discharged 4/11, improvement of symptoms noted. Intermittnetly having L sided neck pain radiation to L side of the head, without visual, autonomic, tinnitus (including pulsitile), focal neurologic symptoms. Lacks good sleeping pattern since hospitalization. Current neuro exam non-focal. MRI brain w/wo at hospital showed severe white matter disease. CSF studies showed WBC 58, but otherwise normal including negative paraneoplastic/autoimmune, NMO, MOG, lyme, cyphilis, CSF panel/culture. Repeat MR brain w/w/o + MR c-spine w/w/o 5/2024 -> no cervical cord enhancement, MR brain with improvement of white matter changes.  Impression: Chronic white matter disease of unclear etiology. No reported drug use. R/o progression of white matter disease from demyelinating vs lower suspicion for leukodystrophy.   Plan: [x] MRI brain w/wo and cervical w/wo -> performed 5/2024; improved white matter disease, c-spine negative [] neuroophthalmology referral for blurred vision [] follow up in neuroimmunology clinic during next visit [] sleep hygiene discussed

## 2024-09-27 NOTE — PHYSICAL EXAM
[Person] : oriented to person [Place] : oriented to place [Time] : oriented to time [Concentration Intact] : normal concentrating ability [Visual Intact] : visual attention was ~T not ~L decreased [Naming Objects] : no difficulty naming common objects [Repeating Phrases] : no difficulty repeating a phrase [Writing A Sentence] : no difficulty writing a sentence [Fluency] : fluency intact [Comprehension] : comprehension intact [Reading] : reading intact [Past History] : adequate knowledge of personal past history [Cranial Nerves Optic (II)] : visual acuity intact bilaterally,  visual fields full to confrontation, pupils equal round and reactive to light [Cranial Nerves Oculomotor (III)] : extraocular motion intact [Cranial Nerves Trigeminal (V)] : facial sensation intact symmetrically [Cranial Nerves Facial (VII)] : face symmetrical [Cranial Nerves Vestibulocochlear (VIII)] : hearing was intact bilaterally [Cranial Nerves Glossopharyngeal (IX)] : tongue and palate midline [Cranial Nerves Accessory (XI - Cranial And Spinal)] : head turning and shoulder shrug symmetric [Cranial Nerves Hypoglossal (XII)] : there was no tongue deviation with protrusion [Motor Tone] : muscle tone was normal in all four extremities [Motor Strength] : muscle strength was normal in all four extremities [No Muscle Atrophy] : normal bulk in all four extremities [Sensation Tactile Decrease] : light touch was intact [Abnormal Walk] : normal gait [Balance] : balance was intact [2+] : Ankle jerk left 2+ [Neck Appearance] : the appearance of the neck was normal [] : the neck was supple [Neck Cervical Mass (___cm)] : no neck mass was observed [Oriented To Time, Place, And Person] : oriented to person, place, and time [PERRL With Normal Accommodation] : pupils were equal in size, round, reactive to light, with normal accommodation [Extraocular Movements] : extraocular movements were intact [No APD] : no afferent pupillary defect [FreeTextEntry1] : MS: Eyes open. Responds to verbal stimuli. Normal speech, comprehension and repetition intact CN: PERRL (3mm b/l), EOMI, VFF, No facial asymmetry. V1-3 sensation intact. Tongue midline without atrophy. Motor: 5/5 strength thorughout b/l biceps, triceps, hip flexion, knee extension, knee flexion, dorsi/plantar flexion Sensation: Intact to LT thorughout Reflex: 2+ throughout b/l biceps, BR, 1+ patellar, achilles. Toes down going Coordination: No FTN dysmetria b/l, HTS intact Gait: Normal gait [Past-pointing] : there was no past-pointing [Tremor] : no tremor present [Plantar Reflex Right Only] : normal on the right [Plantar Reflex Left Only] : normal on the left

## 2024-09-30 DIAGNOSIS — R90.82 WHITE MATTER DISEASE, UNSPECIFIED: ICD-10-CM

## 2024-11-01 NOTE — PATIENT PROFILE ADULT - NSPROHMSYMPCOND_GEN_A_NUR
Abdomen , soft, nontender, nondistended , no guarding or rigidity , no masses palpable , normal bowel sounds , Liver and Spleen,  no hepatosplenomegaly , liver nontender altered mental status ,Pt unable to participate, AOx0 confused.

## 2025-04-10 ENCOUNTER — APPOINTMENT (OUTPATIENT)
Dept: NEUROLOGY | Facility: HOSPITAL | Age: 50
End: 2025-04-10